# Patient Record
Sex: FEMALE | Race: WHITE | Employment: OTHER | URBAN - NONMETROPOLITAN AREA
[De-identification: names, ages, dates, MRNs, and addresses within clinical notes are randomized per-mention and may not be internally consistent; named-entity substitution may affect disease eponyms.]

---

## 2017-01-10 ENCOUNTER — OFFICE VISIT (OUTPATIENT)
Dept: PRIMARY CARE CLINIC | Age: 72
End: 2017-01-10
Payer: MEDICARE

## 2017-01-10 VITALS
HEIGHT: 64 IN | WEIGHT: 205 LBS | DIASTOLIC BLOOD PRESSURE: 66 MMHG | HEART RATE: 64 BPM | RESPIRATION RATE: 18 BRPM | BODY MASS INDEX: 35 KG/M2 | SYSTOLIC BLOOD PRESSURE: 138 MMHG | TEMPERATURE: 97.5 F | OXYGEN SATURATION: 97 %

## 2017-01-10 DIAGNOSIS — E03.9 HYPOTHYROIDISM, UNSPECIFIED TYPE: ICD-10-CM

## 2017-01-10 DIAGNOSIS — I10 ESSENTIAL HYPERTENSION: Primary | ICD-10-CM

## 2017-01-10 DIAGNOSIS — Z11.59 NEED FOR HEPATITIS C SCREENING TEST: ICD-10-CM

## 2017-01-10 DIAGNOSIS — N18.9 CHRONIC KIDNEY DISEASE, UNSPECIFIED STAGE: ICD-10-CM

## 2017-01-10 PROCEDURE — 99214 OFFICE O/P EST MOD 30 MIN: CPT | Performed by: FAMILY MEDICINE

## 2017-01-10 RX ORDER — METOPROLOL SUCCINATE 50 MG/1
50 TABLET, EXTENDED RELEASE ORAL DAILY
Qty: 90 TABLET | Refills: 3 | Status: SHIPPED | OUTPATIENT
Start: 2017-01-10 | End: 2017-11-29 | Stop reason: SDUPTHER

## 2017-01-10 RX ORDER — HYDROCODONE BITARTRATE AND ACETAMINOPHEN 7.5; 325 MG/1; MG/1
1 TABLET ORAL EVERY 6 HOURS PRN
Qty: 120 TABLET | Refills: 0 | Status: SHIPPED | OUTPATIENT
Start: 2017-01-10 | End: 2017-11-29 | Stop reason: SDUPTHER

## 2017-01-10 RX ORDER — OMEPRAZOLE 40 MG/1
40 CAPSULE, DELAYED RELEASE ORAL DAILY
Qty: 90 CAPSULE | Refills: 3 | Status: SHIPPED | OUTPATIENT
Start: 2017-01-10 | End: 2017-06-19 | Stop reason: SDUPTHER

## 2017-01-10 RX ORDER — LISINOPRIL 10 MG/1
10 TABLET ORAL DAILY
Qty: 90 TABLET | Refills: 3 | Status: SHIPPED | OUTPATIENT
Start: 2017-01-10 | End: 2017-11-29 | Stop reason: SDUPTHER

## 2017-01-10 RX ORDER — LUBIPROSTONE 24 UG/1
24 CAPSULE, GELATIN COATED ORAL 2 TIMES DAILY WITH MEALS
Qty: 60 CAPSULE | Refills: 3 | Status: SHIPPED | OUTPATIENT
Start: 2017-01-10 | End: 2017-10-25 | Stop reason: ALTCHOICE

## 2017-01-10 RX ORDER — OXYBUTYNIN CHLORIDE 5 MG/1
5 TABLET ORAL 2 TIMES DAILY
Qty: 180 TABLET | Refills: 3 | Status: SHIPPED | OUTPATIENT
Start: 2017-01-10 | End: 2017-08-24

## 2017-01-10 RX ORDER — TRAZODONE HYDROCHLORIDE 150 MG/1
150 TABLET ORAL NIGHTLY
Qty: 90 TABLET | Refills: 3 | Status: SHIPPED | OUTPATIENT
Start: 2017-01-10 | End: 2017-11-29 | Stop reason: SDUPTHER

## 2017-01-10 ASSESSMENT — ENCOUNTER SYMPTOMS
BACK PAIN: 0
COUGH: 0
EYE DISCHARGE: 0
ABDOMINAL PAIN: 0
WHEEZING: 0
DIARRHEA: 0
NAUSEA: 0
VOMITING: 0
COLOR CHANGE: 0

## 2017-01-16 ENCOUNTER — TELEPHONE (OUTPATIENT)
Dept: PRIMARY CARE CLINIC | Age: 72
End: 2017-01-16

## 2017-01-20 DIAGNOSIS — Z11.59 NEED FOR HEPATITIS C SCREENING TEST: Primary | ICD-10-CM

## 2017-03-28 RX ORDER — CLOBETASOL PROPIONATE 0.5 MG/G
OINTMENT TOPICAL
Qty: 120 G | Refills: 3 | Status: SHIPPED | OUTPATIENT
Start: 2017-03-28 | End: 2021-04-13

## 2017-07-26 RX ORDER — LEVOTHYROXINE SODIUM 0.12 MG/1
TABLET ORAL
Qty: 90 TABLET | Refills: 0 | Status: SHIPPED | OUTPATIENT
Start: 2017-07-26 | End: 2017-10-26 | Stop reason: SDUPTHER

## 2017-07-26 RX ORDER — METOPROLOL SUCCINATE 50 MG/1
TABLET, EXTENDED RELEASE ORAL
Qty: 90 TABLET | Refills: 0 | Status: SHIPPED | OUTPATIENT
Start: 2017-07-26 | End: 2017-10-25 | Stop reason: SDUPTHER

## 2017-08-01 ENCOUNTER — OFFICE VISIT (OUTPATIENT)
Dept: PRIMARY CARE CLINIC | Age: 72
End: 2017-08-01
Payer: MEDICARE

## 2017-08-01 VITALS
WEIGHT: 199 LBS | HEART RATE: 55 BPM | DIASTOLIC BLOOD PRESSURE: 76 MMHG | OXYGEN SATURATION: 98 % | HEIGHT: 63 IN | RESPIRATION RATE: 18 BRPM | SYSTOLIC BLOOD PRESSURE: 139 MMHG | BODY MASS INDEX: 35.26 KG/M2 | TEMPERATURE: 98 F

## 2017-08-01 DIAGNOSIS — Z23 NEED FOR PNEUMOCOCCAL VACCINATION: ICD-10-CM

## 2017-08-01 DIAGNOSIS — R07.9 CHEST PAIN, UNSPECIFIED TYPE: ICD-10-CM

## 2017-08-01 DIAGNOSIS — K62.89 RECTAL PAIN: Primary | ICD-10-CM

## 2017-08-01 DIAGNOSIS — R01.1 HEART MURMUR: ICD-10-CM

## 2017-08-01 DIAGNOSIS — R42 VERTIGO: ICD-10-CM

## 2017-08-01 DIAGNOSIS — R19.4 CHANGE IN BOWEL HABIT: ICD-10-CM

## 2017-08-01 PROCEDURE — 1123F ACP DISCUSS/DSCN MKR DOCD: CPT | Performed by: FAMILY MEDICINE

## 2017-08-01 PROCEDURE — G8399 PT W/DXA RESULTS DOCUMENT: HCPCS | Performed by: FAMILY MEDICINE

## 2017-08-01 PROCEDURE — 99214 OFFICE O/P EST MOD 30 MIN: CPT | Performed by: FAMILY MEDICINE

## 2017-08-01 PROCEDURE — 1036F TOBACCO NON-USER: CPT | Performed by: FAMILY MEDICINE

## 2017-08-01 PROCEDURE — 4040F PNEUMOC VAC/ADMIN/RCVD: CPT | Performed by: FAMILY MEDICINE

## 2017-08-01 PROCEDURE — G0009 ADMIN PNEUMOCOCCAL VACCINE: HCPCS | Performed by: FAMILY MEDICINE

## 2017-08-01 PROCEDURE — 1090F PRES/ABSN URINE INCON ASSESS: CPT | Performed by: FAMILY MEDICINE

## 2017-08-01 PROCEDURE — G8427 DOCREV CUR MEDS BY ELIG CLIN: HCPCS | Performed by: FAMILY MEDICINE

## 2017-08-01 PROCEDURE — 3014F SCREEN MAMMO DOC REV: CPT | Performed by: FAMILY MEDICINE

## 2017-08-01 PROCEDURE — 90732 PPSV23 VACC 2 YRS+ SUBQ/IM: CPT | Performed by: FAMILY MEDICINE

## 2017-08-01 PROCEDURE — 3017F COLORECTAL CA SCREEN DOC REV: CPT | Performed by: FAMILY MEDICINE

## 2017-08-01 PROCEDURE — G8417 CALC BMI ABV UP PARAM F/U: HCPCS | Performed by: FAMILY MEDICINE

## 2017-08-01 RX ORDER — MECLIZINE HYDROCHLORIDE 25 MG/1
25 TABLET ORAL 3 TIMES DAILY PRN
COMMUNITY
End: 2019-03-20

## 2017-08-11 ASSESSMENT — ENCOUNTER SYMPTOMS
BACK PAIN: 0
EYE DISCHARGE: 0
BLOOD IN STOOL: 1
COUGH: 0
SHORTNESS OF BREATH: 1
DIARRHEA: 0
COLOR CHANGE: 0
CHEST TIGHTNESS: 1
NAUSEA: 0
RECTAL PAIN: 1
ABDOMINAL PAIN: 1
CONSTIPATION: 1
VOMITING: 0
WHEEZING: 0

## 2017-08-22 RX ORDER — TIZANIDINE 4 MG/1
TABLET ORAL
Qty: 270 TABLET | Refills: 0 | Status: SHIPPED | OUTPATIENT
Start: 2017-08-22 | End: 2018-01-03 | Stop reason: SDUPTHER

## 2017-08-24 RX ORDER — OXYBUTYNIN CHLORIDE 5 MG/1
TABLET ORAL
Qty: 180 TABLET | Refills: 0 | Status: SHIPPED | OUTPATIENT
Start: 2017-08-24 | End: 2017-11-27 | Stop reason: SDUPTHER

## 2017-10-03 RX ORDER — ALLOPURINOL 100 MG/1
100 TABLET ORAL DAILY
Qty: 30 TABLET | Refills: 3 | Status: SHIPPED | OUTPATIENT
Start: 2017-10-03 | End: 2017-11-29 | Stop reason: SDUPTHER

## 2017-10-24 ENCOUNTER — TELEPHONE (OUTPATIENT)
Dept: CARDIOLOGY | Age: 72
End: 2017-10-24

## 2017-10-25 ENCOUNTER — OFFICE VISIT (OUTPATIENT)
Dept: CARDIOLOGY | Age: 72
End: 2017-10-25
Payer: MEDICARE

## 2017-10-25 VITALS
HEIGHT: 63 IN | HEART RATE: 68 BPM | SYSTOLIC BLOOD PRESSURE: 136 MMHG | DIASTOLIC BLOOD PRESSURE: 74 MMHG | WEIGHT: 194 LBS | BODY MASS INDEX: 34.38 KG/M2

## 2017-10-25 DIAGNOSIS — I10 ESSENTIAL HYPERTENSION: Primary | ICD-10-CM

## 2017-10-25 PROCEDURE — 1123F ACP DISCUSS/DSCN MKR DOCD: CPT | Performed by: INTERNAL MEDICINE

## 2017-10-25 PROCEDURE — 93000 ELECTROCARDIOGRAM COMPLETE: CPT | Performed by: INTERNAL MEDICINE

## 2017-10-25 PROCEDURE — G8427 DOCREV CUR MEDS BY ELIG CLIN: HCPCS | Performed by: INTERNAL MEDICINE

## 2017-10-25 PROCEDURE — 1036F TOBACCO NON-USER: CPT | Performed by: INTERNAL MEDICINE

## 2017-10-25 PROCEDURE — 4040F PNEUMOC VAC/ADMIN/RCVD: CPT | Performed by: INTERNAL MEDICINE

## 2017-10-25 PROCEDURE — G8399 PT W/DXA RESULTS DOCUMENT: HCPCS | Performed by: INTERNAL MEDICINE

## 2017-10-25 PROCEDURE — 3017F COLORECTAL CA SCREEN DOC REV: CPT | Performed by: INTERNAL MEDICINE

## 2017-10-25 PROCEDURE — 99204 OFFICE O/P NEW MOD 45 MIN: CPT | Performed by: INTERNAL MEDICINE

## 2017-10-25 PROCEDURE — 3014F SCREEN MAMMO DOC REV: CPT | Performed by: INTERNAL MEDICINE

## 2017-10-25 PROCEDURE — G8417 CALC BMI ABV UP PARAM F/U: HCPCS | Performed by: INTERNAL MEDICINE

## 2017-10-25 PROCEDURE — G8484 FLU IMMUNIZE NO ADMIN: HCPCS | Performed by: INTERNAL MEDICINE

## 2017-10-25 PROCEDURE — 1090F PRES/ABSN URINE INCON ASSESS: CPT | Performed by: INTERNAL MEDICINE

## 2017-10-25 RX ORDER — FLUTICASONE PROPIONATE 50 MCG
1 SPRAY, SUSPENSION (ML) NASAL DAILY
COMMUNITY
End: 2019-03-20

## 2017-10-25 NOTE — PROGRESS NOTES
Dear Dr. Elizabeth Ramirez MD,    Thank you for allowing me to participate in the care of Ms. Cheko Sanchez. She presents today at the [address] [place] [address] in the [facility] with her daughter, and . As you know, Ms. Elsa Beverly is a 81 y.o. female with history of hypertension, hearing loss, vertigo who presents with the chief complaint of chest pain. The patient tells me that for the past 5 months she has had 5 episodes of chest pressure has been mid substernal and did not radiate. She would have a dull ache in her left shoulder blade afterwards. This was sometimes be associated with breathlessness and would last no more than 5 minutes. This would come on always at rest.  He would not get worse with walking. Only time and make it better. She was recently diagnosed with Almodovar's esophagus and complains of food getting stuck in her esophagus. She is not found to have a stricture. She had a Jodean Mower in July that was negative and a transthoracic echo which showed mild mitral regurgitation, mild tricuspid regurgitation, and a normal LV systolic function. She otherwise denies SOA, BARBOSA, PND, orthopnea, syncope or near syncope. She has no other complaints. She does not have a family history of heart disease, she has not smoker, she does not have diabetes or hyperlipidemia.     ROS    Past Medical History:   Diagnosis Date    Abdominal pain, unspecified site     Antral gastritis     Anxiety and depression     Arthritis     Back pain     Chest pressure 05/2017    Decrease in appetite     Ear infection     GERD (gastroesophageal reflux disease)     Heart murmur     History of colon polyps     Mastodynia     rt axilla into breast    Neck pain     Otalgia, unspecified     Rheumatoid arthritis(714.0)     Sigmoid diverticulosis     Unspecified otitis media     Weight loss     30 lbs in 4-5 months       Past Surgical History:   Procedure Laterality Date    tablet, Rfl: 0    omeprazole (PRILOSEC) 40 MG delayed release capsule, TAKE 1 CAPSULE BY MOUTH DAILY, Disp: 90 capsule, Rfl: 3    clobetasol (TEMOVATE) 0.05 % ointment, Apply topically 2 times daily. , Disp: 120 g, Rfl: 3    traZODone (DESYREL) 150 MG tablet, Take 1 tablet by mouth nightly, Disp: 90 tablet, Rfl: 3    lisinopril (PRINIVIL;ZESTRIL) 10 MG tablet, Take 1 tablet by mouth daily, Disp: 90 tablet, Rfl: 3    metoprolol succinate (TOPROL XL) 50 MG extended release tablet, Take 1 tablet by mouth daily, Disp: 90 tablet, Rfl: 3    HYDROcodone-acetaminophen (NORCO) 7.5-325 MG per tablet, Take 1 tablet by mouth every 6 hours as needed for Pain ., Disp: 120 tablet, Rfl: 0    colchicine (COLCRYS) 0.6 MG tablet, 1.2 mg po and then 0.6 mg po 1 hour later (Patient taking differently: Take 0.6 mg by mouth as needed 1.2 mg po and then 0.6 mg po 1 hour later), Disp: 3 tablet, Rfl: 3    Omega-3 Fatty Acids (FISH OIL BURP-LESS PO), Take by mouth, Disp: , Rfl:     vitamin D (ERGOCALCIFEROL) 30810 UNITS CAPS capsule, Take 50,000 Units by mouth once a week , Disp: , Rfl: 3    TRAVATAN Z 0.004 % SOLN ophthalmic solution, Place 1 drop into both eyes nightly , Disp: , Rfl:     lidocaine (LIDODERM) 5 %, Place 1 patch onto the skin daily 12 hours on, 12 hours off., Disp: 90 patch, Rfl: 3    acetaminophen (TYLENOL) 325 MG tablet, Take 650 mg by mouth every 6 hours as needed for Pain., Disp: , Rfl:     calcium carbonate 600 MG TABS tablet, Take 1 tablet by mouth daily as needed. , Disp: , Rfl:     PE:  Vitals:    10/25/17 1418   BP: 136/74   Pulse: 68       Estimated body mass index is 34.37 kg/m² as calculated from the following:    Height as of this encounter: 5' 3\" (1.6 m). Weight as of this encounter: 194 lb (88 kg).     General - No acute distress  Eyes - PERRL, anicteric sclerae; no lid-lag  ENMT - Atraumatic; Mucous membranes moist, oropharynx clear  Neck - trachea midline, thyroid non-tender  Cardio - No jugular venous distension                Clear s1 s2, no gallop, rub, murmur                 No edema, normal pulses  Resp - Normal effort, Clear to auscultation bilaterally  GI - abdomen soft, non-tender, no hepatosplenomegaly  Skin - warm and dry; no rashes  Psych - A+O x 3, normal affect    Lab Results   Component Value Date    CREATININE 1.1 12/23/2013    CREATININE 1.5 10/04/2013    HGB 12.8 12/23/2013       ECG 10/25/17  Sinus rhythm, poor R-wave progression     Assessment, Recommendations, & Plan:  81 y.o. female with atypical chest pain, hypertension, vertigo    Chest pain - I have a greater sense of confidence that this is a noncardiac chest pain with her negative stress test.  She also has active GI symptoms and this could be very well esophageal spasm. At this time, I think monitoring her for symptoms as best.  She is normally active and doesn't have problems with chest pain though she is slowed by her orthopedic issues. She is going to monitor her activity especially while at [facility] in follow-up with me. We may consider a different modality for stressing first before they go to cath. We'll consideration would be changing her PPI to something else. Hypertension - controlled, no changes    Vertigo - I think this has something to do with her right ear hearing loss; she says that when the room spins and she gets dizzy she always falls to the left. Continue meclizine    Disposition - RTC in 3 months or sooner if needed    Thank you very much for allowing me to participate in this patient's care. Please do not hesitate to contact me for any questions or concerns. Sincerely yours,    Davey Arellano MD, MSc  Structural Heart Disease Interventions  [facility]

## 2017-10-26 RX ORDER — LEVOTHYROXINE SODIUM 0.12 MG/1
TABLET ORAL
Qty: 90 TABLET | Refills: 5 | Status: SHIPPED | OUTPATIENT
Start: 2017-10-26 | End: 2018-11-12 | Stop reason: SDUPTHER

## 2017-11-27 RX ORDER — OXYBUTYNIN CHLORIDE 5 MG/1
5 TABLET ORAL 2 TIMES DAILY
Qty: 180 TABLET | Refills: 3 | Status: SHIPPED | OUTPATIENT
Start: 2017-11-27 | End: 2019-07-15

## 2017-11-29 ENCOUNTER — OFFICE VISIT (OUTPATIENT)
Dept: PRIMARY CARE CLINIC | Age: 72
End: 2017-11-29
Payer: MEDICARE

## 2017-11-29 VITALS
HEART RATE: 63 BPM | DIASTOLIC BLOOD PRESSURE: 66 MMHG | WEIGHT: 201 LBS | SYSTOLIC BLOOD PRESSURE: 138 MMHG | HEIGHT: 64 IN | RESPIRATION RATE: 20 BRPM | OXYGEN SATURATION: 98 % | TEMPERATURE: 97.3 F | BODY MASS INDEX: 34.31 KG/M2

## 2017-11-29 DIAGNOSIS — E03.9 ACQUIRED HYPOTHYROIDISM: ICD-10-CM

## 2017-11-29 DIAGNOSIS — Z00.00 ROUTINE GENERAL MEDICAL EXAMINATION AT A HEALTH CARE FACILITY: ICD-10-CM

## 2017-11-29 DIAGNOSIS — Z12.31 SCREENING MAMMOGRAM, ENCOUNTER FOR: Primary | ICD-10-CM

## 2017-11-29 PROCEDURE — G0438 PPPS, INITIAL VISIT: HCPCS | Performed by: FAMILY MEDICINE

## 2017-11-29 RX ORDER — OMEPRAZOLE 40 MG/1
40 CAPSULE, DELAYED RELEASE ORAL DAILY
Qty: 90 CAPSULE | Refills: 3 | Status: SHIPPED | OUTPATIENT
Start: 2017-11-29 | End: 2019-01-03 | Stop reason: SDUPTHER

## 2017-11-29 RX ORDER — TRAZODONE HYDROCHLORIDE 150 MG/1
150 TABLET ORAL NIGHTLY
Qty: 90 TABLET | Refills: 3 | Status: SHIPPED | OUTPATIENT
Start: 2017-11-29 | End: 2019-03-20 | Stop reason: DRUGHIGH

## 2017-11-29 RX ORDER — LISINOPRIL 10 MG/1
10 TABLET ORAL DAILY
Qty: 90 TABLET | Refills: 3 | Status: SHIPPED | OUTPATIENT
Start: 2017-11-29 | End: 2018-02-06 | Stop reason: SDUPTHER

## 2017-11-29 RX ORDER — HYDROCODONE BITARTRATE AND ACETAMINOPHEN 7.5; 325 MG/1; MG/1
1 TABLET ORAL EVERY 6 HOURS PRN
Qty: 120 TABLET | Refills: 0 | Status: SHIPPED | OUTPATIENT
Start: 2017-11-29 | End: 2018-11-13 | Stop reason: SDUPTHER

## 2017-11-29 RX ORDER — METOPROLOL SUCCINATE 50 MG/1
50 TABLET, EXTENDED RELEASE ORAL DAILY
Qty: 90 TABLET | Refills: 3 | Status: SHIPPED | OUTPATIENT
Start: 2017-11-29 | End: 2018-01-22 | Stop reason: SDUPTHER

## 2017-11-29 RX ORDER — ALLOPURINOL 100 MG/1
100 TABLET ORAL DAILY
Qty: 30 TABLET | Refills: 3 | Status: SHIPPED | OUTPATIENT
Start: 2017-11-29 | End: 2018-06-07 | Stop reason: SDUPTHER

## 2017-11-29 ASSESSMENT — LIFESTYLE VARIABLES: HOW OFTEN DO YOU HAVE A DRINK CONTAINING ALCOHOL: 0

## 2017-11-29 ASSESSMENT — PATIENT HEALTH QUESTIONNAIRE - PHQ9
SUM OF ALL RESPONSES TO PHQ QUESTIONS 1-9: 0
2. FEELING DOWN, DEPRESSED OR HOPELESS: 0
SUM OF ALL RESPONSES TO PHQ9 QUESTIONS 1 & 2: 0
SUM OF ALL RESPONSES TO PHQ QUESTIONS 1-9: 0
1. LITTLE INTEREST OR PLEASURE IN DOING THINGS: 0

## 2017-11-29 ASSESSMENT — ANXIETY QUESTIONNAIRES: GAD7 TOTAL SCORE: 0

## 2017-11-30 NOTE — PROGRESS NOTES
Medicare Annual Wellness Visit  Name: Chula Brar Date: 2017   MRN: [id number] Sex: Female   Age: 81 y.o. Ethnicity: Non-/Non    : 1945 Race: Alex Talbert is here for Medicare AWV    Screenings for behavioral, psychosocial and functional/safety risks, and cognitive dysfunction are all negative except as indicated below. These results, as well as other patient data from the [address] [facility] [address] form, are documented in Flowsheets linked to this Encounter. Allergies   Allergen Reactions    Fish-Derived Products Swelling     Smell of cooking fish causes hives and throat closing off    Pcn [Penicillins] Hives     Prior to Visit Medications    Medication Sig Taking? Authorizing Provider   allopurinol (ZYLOPRIM) 100 MG tablet Take 1 tablet by mouth daily Yes Emil Duncan MD   omeprazole (PRILOSEC) 40 MG delayed release capsule Take 1 capsule by mouth daily Yes Emil Duncan MD   traZODone (DESYREL) 150 MG tablet Take 1 tablet by mouth nightly Yes Emil Duncan MD   lisinopril (PRINIVIL;ZESTRIL) 10 MG tablet Take 1 tablet by mouth daily Yes Emil Duncan MD   metoprolol succinate (TOPROL XL) 50 MG extended release tablet Take 1 tablet by mouth daily Yes Emil Duncan MD   HYDROcodone-acetaminophen (NORCO) 7.5-325 MG per tablet Take 1 tablet by mouth every 6 hours as needed for Pain . Yes Emil Duncan MD   oxybutynin (DITROPAN) 5 MG tablet Take 1 tablet by mouth 2 times daily Yes Naty Forman MD   levothyroxine (SYNTHROID) 125 MCG tablet TAKE 1 TABLET BY MOUTH EVERY DAY Yes Naty Forman MD   tiZANidine (ZANAFLEX) 4 MG tablet TAKE 1 TABLET BY MOUTH THREE TIMES DAILY Yes Naty Forman MD   meclizine (ANTIVERT) 25 MG tablet Take 25 mg by mouth 3 times daily as needed Yes Historical Provider, MD   clobetasol (TEMOVATE) 0.05 % ointment Apply topically 2 times daily.  Yes Ronda Whitman MD   colchicine (COLCRYS) 0.6 MG tablet 1.2 mg po and then 0.6 mg po 1 hour later  Patient taking differently: Take 0.6 mg by mouth as needed 1.2 mg po and then 0.6 mg po 1 hour later Yes Naty Forman MD   Omega-3 Fatty Acids (FISH OIL BURP-LESS PO) Take by mouth Yes Historical Provider, MD   TRAVATAN Z 0.004 % SOLN ophthalmic solution Place 1 drop into both eyes nightly  Yes Historical Provider, MD   lidocaine (LIDODERM) 5 % Place 1 patch onto the skin daily 12 hours on, 12 hours off. Yes MILO Steve   acetaminophen (TYLENOL) 325 MG tablet Take 650 mg by mouth every 6 hours as needed for Pain. Yes Historical Provider, MD   calcium carbonate 600 MG TABS tablet Take 1 tablet by mouth daily as needed.  Yes Historical Provider, MD   fluticasone (FLONASE) 50 MCG/ACT nasal spray 1 spray by Nasal route daily  Historical Provider, MD   vitamin D (ERGOCALCIFEROL) 78445 UNITS CAPS capsule Take 50,000 Units by mouth once a week   Historical Provider, MD     Past Medical History:   Diagnosis Date    Abdominal pain, unspecified site     Antral gastritis     Anxiety and depression     Arthritis     Back pain     Chest pressure 05/2017    Decrease in appetite     Ear infection     GERD (gastroesophageal reflux disease)     Heart murmur     History of colon polyps     Mastodynia     rt axilla into breast    Neck pain     Otalgia, unspecified     Rheumatoid arthritis(714.0)     Sigmoid diverticulosis     Unspecified otitis media     Weight loss     30 lbs in 4-5 months     Past Surgical History:   Procedure Laterality Date    CHOLECYSTECTOMY  9-8-11    Dr Anel Rincon    COLONOSCOPY  9-12-12    Woody Thibodeaux    INTRACAPSULAR CATARACT EXTRACTION Left 12/28/2015    PHACO CAT EXT W/ IOL LT EYE performed by Symone Paul MD at [facility] Right 2/15/2016    PHACO CAT EXT W/ IOL RT EYE performed by Symone Paul MD at [address] [place] [address] OR    TUBAL LIGATION      UPPER GASTROINTESTINAL ENDOSCOPY  9-12-12    Woody Hu     Family History   Problem Relation Age of Onset    Stroke Father     Cancer Brother      throat   Tiana Gonzales Other Mother      had colectomy and surgeries due to adhesions    Colon Cancer Paternal Uncle     Colon Polyps Paternal Uncle     Diabetes Sister        CareTeam (Including outside providers/suppliers regularly involved in providing care):   Patient Care Team:  George Rae MD as PCP - General (Family Medicine)  George Rae MD as PCP - S Attributed Provider  Davey Back MD as Consulting Physician (Interventional Cardiology)    Wt Readings from Last 3 Encounters:   11/29/17 201 lb (91.2 kg)   10/25/17 194 lb (88 kg)   08/01/17 199 lb (90.3 kg)     Vitals:    11/29/17 1122   BP: 138/66   Site: Right Arm   Position: Sitting   Cuff Size: Medium Adult   Pulse: 63   Resp: 20   Temp: 97.3 °F (36.3 °C)   TempSrc: Temporal   SpO2: 98%   Weight: 201 lb (91.2 kg)   Height: 5' 3.5\" (1.613 m)       General Appearance: alert and oriented to person, place and time, well-developed and well-nourished, in no acute distress  Neck: neck supple and non tender without mass, no thyromegaly or thyroid nodules, no cervical lymphadenopathy   Pulmonary/Chest: clear to auscultation bilaterally- no wheezes, rales or rhonchi, normal air movement, no respiratory distress  Cardiovascular: normal rate, normal S1 and S2, no gallops, intact distal pulses and no carotid bruits  Neurologic: gait and coordination normal and speech normal    Patient's complete Health Risk Assessment and screening values have been reviewed and are found in Flowsheets. The following problems were reviewed today and where indicated follow up appointments were made and/or referrals ordered.     Positive Risk Factor Screenings with Interventions:     Depression:  PHQ-2 Score: 0  Depression Screening Interpretation: (!) PHQ-9 Score 5-9: Mild Addressed    DEXA (modify frequency per FRAX score)  Completed    Pneumococcal low/med risk  Completed     Recommendations for Preventive Services Due: see orders.   Recommended screening schedule for the next 5-10 years is provided to the patient in written form: see Patient Instructions/AVS.

## 2018-01-04 RX ORDER — TRAZODONE HYDROCHLORIDE 150 MG/1
TABLET ORAL
Qty: 90 TABLET | Refills: 0 | Status: SHIPPED | OUTPATIENT
Start: 2018-01-04 | End: 2018-04-09 | Stop reason: SDUPTHER

## 2018-01-22 RX ORDER — METOPROLOL SUCCINATE 50 MG/1
50 TABLET, EXTENDED RELEASE ORAL DAILY
Qty: 90 TABLET | Refills: 3 | Status: SHIPPED | OUTPATIENT
Start: 2018-01-22 | End: 2019-04-14 | Stop reason: SDUPTHER

## 2018-02-22 RX ORDER — OXYBUTYNIN CHLORIDE 5 MG/1
TABLET ORAL
Qty: 180 TABLET | Refills: 0 | Status: SHIPPED | OUTPATIENT
Start: 2018-02-22 | End: 2018-06-28 | Stop reason: SDUPTHER

## 2018-04-03 ENCOUNTER — PATIENT MESSAGE (OUTPATIENT)
Dept: PRIMARY CARE CLINIC | Age: 73
End: 2018-04-03

## 2018-04-03 DIAGNOSIS — E03.9 ACQUIRED HYPOTHYROIDISM: Primary | ICD-10-CM

## 2018-04-09 RX ORDER — TRAZODONE HYDROCHLORIDE 150 MG/1
TABLET ORAL
Qty: 90 TABLET | Refills: 0 | Status: SHIPPED | OUTPATIENT
Start: 2018-04-09 | End: 2018-11-13 | Stop reason: CLARIF

## 2018-06-07 RX ORDER — ALLOPURINOL 100 MG/1
100 TABLET ORAL DAILY
Qty: 30 TABLET | Refills: 3 | Status: SHIPPED | OUTPATIENT
Start: 2018-06-07 | End: 2018-10-04 | Stop reason: SDUPTHER

## 2018-06-28 RX ORDER — OXYBUTYNIN CHLORIDE 5 MG/1
TABLET ORAL
Qty: 180 TABLET | Refills: 3 | Status: SHIPPED | OUTPATIENT
Start: 2018-06-28 | End: 2018-11-13 | Stop reason: CLARIF

## 2018-11-12 RX ORDER — LEVOTHYROXINE SODIUM 0.12 MG/1
TABLET ORAL
Qty: 90 TABLET | Refills: 0 | Status: SHIPPED | OUTPATIENT
Start: 2018-11-12 | End: 2019-02-13 | Stop reason: SDUPTHER

## 2018-11-13 ENCOUNTER — OFFICE VISIT (OUTPATIENT)
Dept: PRIMARY CARE CLINIC | Age: 73
End: 2018-11-13
Payer: MEDICARE

## 2018-11-13 VITALS
DIASTOLIC BLOOD PRESSURE: 74 MMHG | BODY MASS INDEX: 33.13 KG/M2 | SYSTOLIC BLOOD PRESSURE: 120 MMHG | WEIGHT: 187 LBS | HEART RATE: 78 BPM | OXYGEN SATURATION: 98 % | TEMPERATURE: 97.3 F | HEIGHT: 63 IN

## 2018-11-13 DIAGNOSIS — M06.9 RHEUMATOID ARTHRITIS INVOLVING MULTIPLE SITES, UNSPECIFIED RHEUMATOID FACTOR PRESENCE: Primary | ICD-10-CM

## 2018-11-13 DIAGNOSIS — Z79.899 POLYPHARMACY: ICD-10-CM

## 2018-11-13 DIAGNOSIS — I10 ESSENTIAL HYPERTENSION: ICD-10-CM

## 2018-11-13 PROCEDURE — G8427 DOCREV CUR MEDS BY ELIG CLIN: HCPCS | Performed by: FAMILY MEDICINE

## 2018-11-13 PROCEDURE — 3017F COLORECTAL CA SCREEN DOC REV: CPT | Performed by: FAMILY MEDICINE

## 2018-11-13 PROCEDURE — G8417 CALC BMI ABV UP PARAM F/U: HCPCS | Performed by: FAMILY MEDICINE

## 2018-11-13 PROCEDURE — 1123F ACP DISCUSS/DSCN MKR DOCD: CPT | Performed by: FAMILY MEDICINE

## 2018-11-13 PROCEDURE — 1101F PT FALLS ASSESS-DOCD LE1/YR: CPT | Performed by: FAMILY MEDICINE

## 2018-11-13 PROCEDURE — 99214 OFFICE O/P EST MOD 30 MIN: CPT | Performed by: FAMILY MEDICINE

## 2018-11-13 PROCEDURE — G8484 FLU IMMUNIZE NO ADMIN: HCPCS | Performed by: FAMILY MEDICINE

## 2018-11-13 PROCEDURE — G8399 PT W/DXA RESULTS DOCUMENT: HCPCS | Performed by: FAMILY MEDICINE

## 2018-11-13 PROCEDURE — 4040F PNEUMOC VAC/ADMIN/RCVD: CPT | Performed by: FAMILY MEDICINE

## 2018-11-13 PROCEDURE — 1090F PRES/ABSN URINE INCON ASSESS: CPT | Performed by: FAMILY MEDICINE

## 2018-11-13 PROCEDURE — 1036F TOBACCO NON-USER: CPT | Performed by: FAMILY MEDICINE

## 2018-11-13 RX ORDER — TRAZODONE HYDROCHLORIDE 50 MG/1
50 TABLET ORAL NIGHTLY PRN
Qty: 90 TABLET | Refills: 3 | Status: SHIPPED | OUTPATIENT
Start: 2018-11-13 | End: 2020-06-18

## 2018-11-13 RX ORDER — HYDROCODONE BITARTRATE AND ACETAMINOPHEN 7.5; 325 MG/1; MG/1
1 TABLET ORAL EVERY 6 HOURS PRN
Qty: 120 TABLET | Refills: 0 | Status: SHIPPED | OUTPATIENT
Start: 2018-11-13 | End: 2019-03-20 | Stop reason: SDUPTHER

## 2018-11-15 ASSESSMENT — ENCOUNTER SYMPTOMS
COLOR CHANGE: 0
NAUSEA: 0
ABDOMINAL PAIN: 0
EYE DISCHARGE: 0
VOMITING: 0
DIARRHEA: 0
BACK PAIN: 0
COUGH: 0
WHEEZING: 0

## 2018-11-15 NOTE — PROGRESS NOTES
SUBJECTIVE:    Patient ID: Alfa Torres is a 72 y.o. female. HPI:   Patient presents today for a follow-up. She states that overall she is doing fairly well. She states that she went to the hospital because she had a lot of dizziness, lightheadedness, and fatigue. She was taking her Zanaflex 3 times a day. She states that she was also taking trazodone 150 mg nightly every night. She states that they told her that these could be the culprit for her feeling bad. She states that she has stopped taking the Zanaflex altogether. She states that she is also been breaking her trazodone in half and feels much better. She states that she is still sleeping well at night. She states that she is staying much less fatigued during the day and is much less lightheaded. She states that her dizziness is almost resolved. She does have a history of rheumatoid arthritis and occasionally takes hydrocodone. She only takes this when she needs it and uses it very sparingly. She states that she is needing a refill because she has run out of her medication. She states that she has not filled it since last year. Typically 1 prescription will last her for the full year. She is monitoring her blood pressures and they're running well controlled. She denies any side effects with her medications and is tolerating them well. She denies any chest pain or palpitations.   Past Medical History:   Diagnosis Date    Abdominal pain, unspecified site     Antral gastritis     Anxiety and depression     Arthritis     Back pain     Chest pressure 05/2017    Decrease in appetite     Ear infection     GERD (gastroesophageal reflux disease)     Heart murmur     History of colon polyps     Mastodynia     rt axilla into breast    Neck pain     Otalgia, unspecified     Rheumatoid arthritis(714.0)     Sigmoid diverticulosis     Unspecified otitis media     Weight loss     30 lbs in 4-5 months      Current Outpatient medications for this visit. Allergies   Allergen Reactions    Fish-Derived Products Swelling     Smell of cooking fish causes hives and throat closing off    Pcn [Penicillins] Hives       Review of Systems   Constitutional: Negative for activity change, appetite change and fever. HENT: Negative for congestion and nosebleeds. Eyes: Negative for discharge. Respiratory: Negative for cough and wheezing. Cardiovascular: Negative for chest pain and leg swelling. Gastrointestinal: Negative for abdominal pain, diarrhea, nausea and vomiting. Genitourinary: Negative for difficulty urinating, frequency and urgency. Musculoskeletal: Negative for back pain and gait problem. Skin: Negative for color change and rash. Neurological: Negative for dizziness, light-headedness and headaches. Hematological: Does not bruise/bleed easily. Psychiatric/Behavioral: Negative for sleep disturbance and suicidal ideas. OBJECTIVE:    Physical Exam   Constitutional: She is oriented to person, place, and time. She appears well-developed. No distress. HENT:   Head: Normocephalic and atraumatic. Neck: Normal range of motion. Neck supple. Cardiovascular: Normal rate, regular rhythm and normal heart sounds. No murmur heard. Pulmonary/Chest: Effort normal and breath sounds normal. No respiratory distress. Neurological: She is alert and oriented to person, place, and time. Skin: Skin is warm and dry. She is not diaphoretic. Psychiatric: She has a normal mood and affect. Her behavior is normal. Judgment and thought content normal.      /74   Pulse 78   Temp 97.3 °F (36.3 °C) (Temporal)   Ht 5' 3\" (1.6 m)   Wt 187 lb (84.8 kg)   SpO2 98%   BMI 33.13 kg/m²      ASSESSMENT:    Maddy Kerr was seen today for medication refill and discuss medications.     Diagnoses and all orders for this visit:    Rheumatoid arthritis involving multiple sites, unspecified rheumatoid factor presence ([facility] [address]  - HYDROcodone-acetaminophen (NORCO) 7.5-325 MG per tablet; Take 1 tablet by mouth every 6 hours as needed for Pain for up to 30 days. .    Essential hypertension    Polypharmacy    Other orders  -     traZODone (DESYREL) 50 MG tablet; Take 1 tablet by mouth nightly as needed for Sleep        PLAN:    Continue current medications. We are going to decrease trazodone anymore since she is sleeping well and try doing the 50 mg.  I've refilled her [place] today. Anmol requested. Follow-up with us in 6 months for checkup unless needed sooner. EMR Dragon/transcription disclaimer:  Much of this encounter note is electronic transcription/translation of spoken language toprinted texts. The electronic translation of spoken language may be erroneous, or at times, nonsensical words or phrases may be inadvertently transcribed.   Although I have reviewed the note for such errors, some may stillexist.

## 2018-12-17 RX ORDER — ALLOPURINOL 100 MG/1
100 TABLET ORAL DAILY
Qty: 30 TABLET | Refills: 2 | Status: SHIPPED | OUTPATIENT
Start: 2018-12-17 | End: 2019-03-19 | Stop reason: SDUPTHER

## 2019-01-03 RX ORDER — OMEPRAZOLE 40 MG/1
40 CAPSULE, DELAYED RELEASE ORAL DAILY
Qty: 90 CAPSULE | Refills: 0 | Status: SHIPPED | OUTPATIENT
Start: 2019-01-03 | End: 2019-04-13 | Stop reason: SDUPTHER

## 2019-02-13 RX ORDER — LEVOTHYROXINE SODIUM 0.12 MG/1
TABLET ORAL
Qty: 90 TABLET | Refills: 0 | Status: SHIPPED | OUTPATIENT
Start: 2019-02-13 | End: 2019-05-20 | Stop reason: SDUPTHER

## 2019-03-04 RX ORDER — COLCHICINE 0.6 MG/1
0.6 TABLET ORAL DAILY
Qty: 30 TABLET | Refills: 1 | Status: SHIPPED | OUTPATIENT
Start: 2019-03-04 | End: 2019-03-20 | Stop reason: SDUPTHER

## 2019-03-19 RX ORDER — ALLOPURINOL 100 MG/1
100 TABLET ORAL DAILY
Qty: 30 TABLET | Refills: 3 | Status: SHIPPED | OUTPATIENT
Start: 2019-03-19 | End: 2019-07-26 | Stop reason: SDUPTHER

## 2019-03-20 ENCOUNTER — OFFICE VISIT (OUTPATIENT)
Dept: PRIMARY CARE CLINIC | Age: 74
End: 2019-03-20
Payer: MEDICARE

## 2019-03-20 VITALS
BODY MASS INDEX: 32.25 KG/M2 | OXYGEN SATURATION: 98 % | WEIGHT: 182 LBS | DIASTOLIC BLOOD PRESSURE: 72 MMHG | RESPIRATION RATE: 22 BRPM | SYSTOLIC BLOOD PRESSURE: 114 MMHG | HEIGHT: 63 IN | HEART RATE: 67 BPM | TEMPERATURE: 97.6 F

## 2019-03-20 DIAGNOSIS — N18.30 STAGE 3 CHRONIC KIDNEY DISEASE (HCC): ICD-10-CM

## 2019-03-20 DIAGNOSIS — M06.9 RHEUMATOID ARTHRITIS INVOLVING MULTIPLE SITES, UNSPECIFIED RHEUMATOID FACTOR PRESENCE: ICD-10-CM

## 2019-03-20 DIAGNOSIS — G89.29 CHRONIC PAIN OF LEFT KNEE: Primary | ICD-10-CM

## 2019-03-20 DIAGNOSIS — M54.9 BACK PAIN WITH RADIATION: ICD-10-CM

## 2019-03-20 DIAGNOSIS — M25.562 CHRONIC PAIN OF LEFT KNEE: Primary | ICD-10-CM

## 2019-03-20 PROCEDURE — G8399 PT W/DXA RESULTS DOCUMENT: HCPCS | Performed by: FAMILY MEDICINE

## 2019-03-20 PROCEDURE — G8427 DOCREV CUR MEDS BY ELIG CLIN: HCPCS | Performed by: FAMILY MEDICINE

## 2019-03-20 PROCEDURE — 1036F TOBACCO NON-USER: CPT | Performed by: FAMILY MEDICINE

## 2019-03-20 PROCEDURE — 1101F PT FALLS ASSESS-DOCD LE1/YR: CPT | Performed by: FAMILY MEDICINE

## 2019-03-20 PROCEDURE — 1123F ACP DISCUSS/DSCN MKR DOCD: CPT | Performed by: FAMILY MEDICINE

## 2019-03-20 PROCEDURE — G8417 CALC BMI ABV UP PARAM F/U: HCPCS | Performed by: FAMILY MEDICINE

## 2019-03-20 PROCEDURE — 4040F PNEUMOC VAC/ADMIN/RCVD: CPT | Performed by: FAMILY MEDICINE

## 2019-03-20 PROCEDURE — G8482 FLU IMMUNIZE ORDER/ADMIN: HCPCS | Performed by: FAMILY MEDICINE

## 2019-03-20 PROCEDURE — 99214 OFFICE O/P EST MOD 30 MIN: CPT | Performed by: FAMILY MEDICINE

## 2019-03-20 PROCEDURE — 3017F COLORECTAL CA SCREEN DOC REV: CPT | Performed by: FAMILY MEDICINE

## 2019-03-20 PROCEDURE — 1090F PRES/ABSN URINE INCON ASSESS: CPT | Performed by: FAMILY MEDICINE

## 2019-03-20 RX ORDER — COLCHICINE 0.6 MG/1
0.6 TABLET ORAL DAILY
Qty: 3 TABLET | Refills: 2 | Status: SHIPPED | OUTPATIENT
Start: 2019-03-20 | End: 2020-05-08

## 2019-03-20 RX ORDER — LIDOCAINE 50 MG/G
1 PATCH TOPICAL DAILY
Qty: 90 PATCH | Refills: 3 | Status: SHIPPED | OUTPATIENT
Start: 2019-03-20 | End: 2020-06-18 | Stop reason: SDUPTHER

## 2019-03-20 RX ORDER — HYDROCODONE BITARTRATE AND ACETAMINOPHEN 7.5; 325 MG/1; MG/1
1 TABLET ORAL EVERY 6 HOURS PRN
Qty: 120 TABLET | Refills: 0 | Status: SHIPPED | OUTPATIENT
Start: 2019-03-20 | End: 2019-04-19

## 2019-03-20 ASSESSMENT — PATIENT HEALTH QUESTIONNAIRE - PHQ9
SUM OF ALL RESPONSES TO PHQ QUESTIONS 1-9: 0
SUM OF ALL RESPONSES TO PHQ9 QUESTIONS 1 & 2: 0
1. LITTLE INTEREST OR PLEASURE IN DOING THINGS: 0
SUM OF ALL RESPONSES TO PHQ QUESTIONS 1-9: 0
2. FEELING DOWN, DEPRESSED OR HOPELESS: 0

## 2019-03-20 ASSESSMENT — ENCOUNTER SYMPTOMS
COLOR CHANGE: 0
COUGH: 0
EYE DISCHARGE: 0
VOMITING: 0
NAUSEA: 0
BACK PAIN: 0
ABDOMINAL PAIN: 0
DIARRHEA: 0
WHEEZING: 0

## 2019-05-16 ENCOUNTER — OFFICE VISIT (OUTPATIENT)
Dept: PRIMARY CARE CLINIC | Age: 74
End: 2019-05-16
Payer: MEDICARE

## 2019-05-16 VITALS
HEART RATE: 81 BPM | BODY MASS INDEX: 30.43 KG/M2 | SYSTOLIC BLOOD PRESSURE: 110 MMHG | DIASTOLIC BLOOD PRESSURE: 70 MMHG | OXYGEN SATURATION: 98 % | WEIGHT: 171.8 LBS | RESPIRATION RATE: 16 BRPM | TEMPERATURE: 98 F

## 2019-05-16 DIAGNOSIS — N18.30 STAGE 3 CHRONIC KIDNEY DISEASE (HCC): ICD-10-CM

## 2019-05-16 DIAGNOSIS — M06.9 RHEUMATOID ARTHRITIS INVOLVING MULTIPLE SITES, UNSPECIFIED RHEUMATOID FACTOR PRESENCE: Primary | ICD-10-CM

## 2019-05-16 DIAGNOSIS — I10 ESSENTIAL HYPERTENSION: ICD-10-CM

## 2019-05-16 DIAGNOSIS — E03.9 ACQUIRED HYPOTHYROIDISM: ICD-10-CM

## 2019-05-16 PROCEDURE — 1036F TOBACCO NON-USER: CPT | Performed by: FAMILY MEDICINE

## 2019-05-16 PROCEDURE — 4040F PNEUMOC VAC/ADMIN/RCVD: CPT | Performed by: FAMILY MEDICINE

## 2019-05-16 PROCEDURE — 1090F PRES/ABSN URINE INCON ASSESS: CPT | Performed by: FAMILY MEDICINE

## 2019-05-16 PROCEDURE — 1123F ACP DISCUSS/DSCN MKR DOCD: CPT | Performed by: FAMILY MEDICINE

## 2019-05-16 PROCEDURE — G8399 PT W/DXA RESULTS DOCUMENT: HCPCS | Performed by: FAMILY MEDICINE

## 2019-05-16 PROCEDURE — G8427 DOCREV CUR MEDS BY ELIG CLIN: HCPCS | Performed by: FAMILY MEDICINE

## 2019-05-16 PROCEDURE — 99214 OFFICE O/P EST MOD 30 MIN: CPT | Performed by: FAMILY MEDICINE

## 2019-05-16 PROCEDURE — 3017F COLORECTAL CA SCREEN DOC REV: CPT | Performed by: FAMILY MEDICINE

## 2019-05-16 PROCEDURE — G8417 CALC BMI ABV UP PARAM F/U: HCPCS | Performed by: FAMILY MEDICINE

## 2019-05-16 RX ORDER — HYDROCODONE BITARTRATE AND ACETAMINOPHEN 7.5; 325 MG/1; MG/1
TABLET ORAL
COMMUNITY
End: 2019-07-26 | Stop reason: SDUPTHER

## 2019-05-16 ASSESSMENT — ENCOUNTER SYMPTOMS
NAUSEA: 0
VOMITING: 0
WHEEZING: 0
COLOR CHANGE: 0
EYE DISCHARGE: 0
COUGH: 0
ABDOMINAL PAIN: 0
BACK PAIN: 0
DIARRHEA: 0

## 2019-05-16 NOTE — PROGRESS NOTES
SUBJECTIVE:    Patient ID: Tania Calhoun is a 72 y.o. female. HPI:   Hypertension: Patient here for follow-up of elevated blood pressure. Blood pressure is well controlled at home. Cardiac symptoms none. Patient denies chest pain and palpitations. Cardiovascular risk factors: advanced age (older than 54 for men, 72 for women), hypertension and obesity (BMI >= 30 kg/m2). Use of agents associated with hypertension: none. History of target organ damage: none. She is still seeing nephrology for chronic kidney disease. She states that she saw them a couple months ago. She had blood work done. She states that she is currently not having any issues. She did have any replacement done on her left knee. This was done in April. She states that she has done well with recovery. She is moving well. She states that she is not currently doing any physical therapy because he was pleased with her progress. She has a history of rheumatoid arthritis that she gets hydrocodone from us to help with her pain. She only takes this very sparingly when she needs it. She denies any side effects to the medication or any problems. She has not changed the dosage or increased frequency recently. She states that she does not abuse the medication.     Past Medical History:   Diagnosis Date    Abdominal pain, unspecified site     Antral gastritis     Anxiety and depression     Arthritis     Back pain     Chest pressure 05/2017    Decrease in appetite     Ear infection     GERD (gastroesophageal reflux disease)     Heart murmur     History of colon polyps     Mastodynia     rt axilla into breast    Neck pain     Otalgia, unspecified     Rheumatoid arthritis(714.0)     Sigmoid diverticulosis     Unspecified otitis media     Weight loss     30 lbs in 4-5 months      Current Outpatient Medications   Medication Sig Dispense Refill    HYDROcodone-acetaminophen (NORCO) 7.5-325 MG per tablet Norco 7.5 mg-325 mg tablet   Take 1 tablet every 4-6 hours by oral route as needed.  omeprazole (PRILOSEC) 40 MG delayed release capsule TAKE 1 CAPSULE BY MOUTH DAILY 90 capsule 5    metoprolol succinate (TOPROL XL) 50 MG extended release tablet TAKE 1 TABLET BY MOUTH DAILY 90 tablet 3    lisinopril (PRINIVIL;ZESTRIL) 10 MG tablet TAKE 1 TABLET BY MOUTH EVERY DAY 90 tablet 3    lidocaine (LIDODERM) 5 % Place 1 patch onto the skin daily 12 hours on, 12 hours off. 90 patch 3    colchicine (COLCRYS) 0.6 MG tablet Take 1 tablet by mouth daily 1.2 mg po and then 0.6 mg po 1 hour later 3 tablet 2    allopurinol (ZYLOPRIM) 100 MG tablet TAKE 1 TABLET BY MOUTH DAILY 30 tablet 3    levothyroxine (SYNTHROID) 125 MCG tablet TAKE 1 TABLET BY MOUTH EVERY DAY 90 tablet 0    traZODone (DESYREL) 50 MG tablet Take 1 tablet by mouth nightly as needed for Sleep 90 tablet 3    tiZANidine (ZANAFLEX) 4 MG tablet TAKE 1 TABLET BY MOUTH THREE TIMES DAILY 270 tablet 3    oxybutynin (DITROPAN) 5 MG tablet Take 1 tablet by mouth 2 times daily 180 tablet 3    clobetasol (TEMOVATE) 0.05 % ointment Apply topically 2 times daily. (Patient taking differently: Apply topically 2 times daily as needed Apply topically 2 times daily.) 120 g 3    Omega-3 Fatty Acids (FISH OIL BURP-LESS PO) Take by mouth      TRAVATAN Z 0.004 % SOLN ophthalmic solution Place 1 drop into both eyes nightly       acetaminophen (TYLENOL) 325 MG tablet Take 650 mg by mouth every 6 hours as needed for Pain.  calcium carbonate 600 MG TABS tablet Take 1 tablet by mouth daily as needed. No current facility-administered medications for this visit. Allergies   Allergen Reactions    Fish-Derived Products Swelling     Smell of cooking fish causes hives and throat closing off    Pcn [Penicillins] Hives       Review of Systems   Constitutional: Negative for activity change, appetite change and fever. HENT: Negative for congestion and nosebleeds.     Eyes: Negative for

## 2019-05-21 RX ORDER — LEVOTHYROXINE SODIUM 0.12 MG/1
TABLET ORAL
Qty: 90 TABLET | Refills: 3 | Status: SHIPPED | OUTPATIENT
Start: 2019-05-21 | End: 2020-06-18

## 2019-07-15 RX ORDER — OXYBUTYNIN CHLORIDE 5 MG/1
TABLET ORAL
Qty: 180 TABLET | Refills: 0 | Status: SHIPPED | OUTPATIENT
Start: 2019-07-15 | End: 2019-07-18 | Stop reason: SDUPTHER

## 2019-07-18 RX ORDER — OXYBUTYNIN CHLORIDE 5 MG/1
TABLET ORAL
Qty: 180 TABLET | Refills: 0 | Status: SHIPPED | OUTPATIENT
Start: 2019-07-18 | End: 2020-01-16

## 2019-07-26 DIAGNOSIS — M06.9 RHEUMATOID ARTHRITIS INVOLVING MULTIPLE SITES, UNSPECIFIED RHEUMATOID FACTOR PRESENCE: Primary | ICD-10-CM

## 2019-07-29 RX ORDER — HYDROCODONE BITARTRATE AND ACETAMINOPHEN 7.5; 325 MG/1; MG/1
1 TABLET ORAL
Qty: 60 TABLET | Refills: 0 | Status: SHIPPED | OUTPATIENT
Start: 2019-07-29 | End: 2020-06-18 | Stop reason: SDUPTHER

## 2019-07-29 RX ORDER — ALLOPURINOL 100 MG/1
100 TABLET ORAL DAILY
Qty: 30 TABLET | Refills: 0 | Status: SHIPPED | OUTPATIENT
Start: 2019-07-29 | End: 2019-09-04 | Stop reason: SDUPTHER

## 2020-01-16 RX ORDER — OXYBUTYNIN CHLORIDE 5 MG/1
TABLET ORAL
Qty: 180 TABLET | Refills: 3 | Status: SHIPPED | OUTPATIENT
Start: 2020-01-16 | End: 2020-09-28

## 2020-01-16 RX ORDER — ALLOPURINOL 100 MG/1
100 TABLET ORAL DAILY
Qty: 30 TABLET | Refills: 3 | Status: SHIPPED | OUTPATIENT
Start: 2020-01-16 | End: 2020-03-26

## 2020-03-26 RX ORDER — ALLOPURINOL 100 MG/1
100 TABLET ORAL DAILY
Qty: 30 TABLET | Refills: 3 | Status: SHIPPED | OUTPATIENT
Start: 2020-03-26 | End: 2020-04-15

## 2020-04-15 RX ORDER — METOPROLOL SUCCINATE 50 MG/1
50 TABLET, EXTENDED RELEASE ORAL DAILY
Qty: 90 TABLET | Refills: 3 | Status: SHIPPED | OUTPATIENT
Start: 2020-04-15 | End: 2021-03-26

## 2020-04-15 RX ORDER — ALLOPURINOL 100 MG/1
100 TABLET ORAL DAILY
Qty: 30 TABLET | Refills: 3 | Status: SHIPPED | OUTPATIENT
Start: 2020-04-15 | End: 2020-09-28

## 2020-04-15 RX ORDER — OMEPRAZOLE 40 MG/1
40 CAPSULE, DELAYED RELEASE ORAL DAILY
Qty: 90 CAPSULE | Refills: 5 | Status: SHIPPED | OUTPATIENT
Start: 2020-04-15 | End: 2021-06-24

## 2020-05-08 RX ORDER — COLCHICINE 0.6 MG/1
TABLET ORAL
Qty: 3 TABLET | Refills: 2 | Status: SHIPPED | OUTPATIENT
Start: 2020-05-08

## 2020-06-18 ENCOUNTER — OFFICE VISIT (OUTPATIENT)
Dept: PRIMARY CARE CLINIC | Age: 75
End: 2020-06-18
Payer: MEDICARE

## 2020-06-18 VITALS
HEIGHT: 64 IN | HEART RATE: 57 BPM | DIASTOLIC BLOOD PRESSURE: 70 MMHG | OXYGEN SATURATION: 98 % | RESPIRATION RATE: 16 BRPM | BODY MASS INDEX: 27.21 KG/M2 | WEIGHT: 159.4 LBS | TEMPERATURE: 97.6 F | SYSTOLIC BLOOD PRESSURE: 130 MMHG

## 2020-06-18 PROCEDURE — 1090F PRES/ABSN URINE INCON ASSESS: CPT | Performed by: FAMILY MEDICINE

## 2020-06-18 PROCEDURE — G8399 PT W/DXA RESULTS DOCUMENT: HCPCS | Performed by: FAMILY MEDICINE

## 2020-06-18 PROCEDURE — 3017F COLORECTAL CA SCREEN DOC REV: CPT | Performed by: FAMILY MEDICINE

## 2020-06-18 PROCEDURE — 1036F TOBACCO NON-USER: CPT | Performed by: FAMILY MEDICINE

## 2020-06-18 PROCEDURE — 1123F ACP DISCUSS/DSCN MKR DOCD: CPT | Performed by: FAMILY MEDICINE

## 2020-06-18 PROCEDURE — 4040F PNEUMOC VAC/ADMIN/RCVD: CPT | Performed by: FAMILY MEDICINE

## 2020-06-18 PROCEDURE — G8427 DOCREV CUR MEDS BY ELIG CLIN: HCPCS | Performed by: FAMILY MEDICINE

## 2020-06-18 PROCEDURE — G8417 CALC BMI ABV UP PARAM F/U: HCPCS | Performed by: FAMILY MEDICINE

## 2020-06-18 PROCEDURE — 99214 OFFICE O/P EST MOD 30 MIN: CPT | Performed by: FAMILY MEDICINE

## 2020-06-18 RX ORDER — LEVOTHYROXINE SODIUM 0.1 MG/1
100 TABLET ORAL DAILY
COMMUNITY
End: 2021-04-13 | Stop reason: SDUPTHER

## 2020-06-18 RX ORDER — MULTIVITAMIN WITH IRON
250 TABLET ORAL DAILY
COMMUNITY
End: 2021-01-12

## 2020-06-18 RX ORDER — HYDROCODONE BITARTRATE AND ACETAMINOPHEN 7.5; 325 MG/1; MG/1
1 TABLET ORAL
Qty: 60 TABLET | Refills: 0 | Status: SHIPPED | OUTPATIENT
Start: 2020-06-18 | End: 2021-01-12 | Stop reason: SDUPTHER

## 2020-06-18 RX ORDER — LIDOCAINE 50 MG/G
1 PATCH TOPICAL DAILY
Qty: 90 PATCH | Refills: 3 | Status: SHIPPED | OUTPATIENT
Start: 2020-06-18 | End: 2021-12-28 | Stop reason: SDUPTHER

## 2020-06-18 RX ORDER — HYDROCODONE BITARTRATE AND ACETAMINOPHEN 5; 325 MG/1; MG/1
1 TABLET ORAL DAILY PRN
COMMUNITY
Start: 2020-05-08 | End: 2021-04-13

## 2020-06-18 ASSESSMENT — PATIENT HEALTH QUESTIONNAIRE - PHQ9
2. FEELING DOWN, DEPRESSED OR HOPELESS: 0
1. LITTLE INTEREST OR PLEASURE IN DOING THINGS: 0
SUM OF ALL RESPONSES TO PHQ QUESTIONS 1-9: 0
SUM OF ALL RESPONSES TO PHQ QUESTIONS 1-9: 0
SUM OF ALL RESPONSES TO PHQ9 QUESTIONS 1 & 2: 0

## 2020-06-18 ASSESSMENT — ENCOUNTER SYMPTOMS
COUGH: 0
BACK PAIN: 1
WHEEZING: 0
NAUSEA: 0
EYE DISCHARGE: 0
COLOR CHANGE: 0
ABDOMINAL PAIN: 0
VOMITING: 0
DIARRHEA: 0

## 2020-06-18 NOTE — PROGRESS NOTES
SUBJECTIVE:    Patient ID: Ban Sousa is a 70 y.o. female. HPI:   Patient presents today for a follow-up. It is been a while since she has been seen. She is having problems with chronic left knee pain. She has had a knee replacement done in the orthopedist said that the knee had healed well. She does have a history of rheumatoid arthritis and he has her set up to see a rheumatologist in July. She states that she is having a lot of aches and cramps. She states that she is wanting to know she can get some pain medication to help with the left knee pain and chronic back pain. She only takes this intermittently when she needs it. She does have a history of obstructive sleep apnea. She has just recently gotten a new CPAP machine. She states that she is going to be using this nightly. She states her old machine had broken and so she had stopped using it because the humidifier was blowing air back in her face. She states that she is staying tired. She states that she is only sleeping about 4 hours per night. She does have a history of hypertension. She states that she is taking her blood pressure medication as prescribed. She denies any chest pains or palpitations. She states that she is not monitoring her blood pressure at home.       Past Medical History:   Diagnosis Date    Abdominal pain, unspecified site     Antral gastritis     Anxiety and depression     Arthritis     Back pain     Chest pressure 05/2017    Decrease in appetite     Ear infection     GERD (gastroesophageal reflux disease)     Heart murmur     History of colon polyps     Mastodynia     rt axilla into breast    Neck pain     Otalgia, unspecified     Rheumatoid arthritis(714.0)     Sigmoid diverticulosis     Unspecified otitis media     Weight loss     30 lbs in 4-5 months      Current Outpatient Medications   Medication Sig Dispense Refill    magnesium (MAGNESIUM-OXIDE) 250 MG TABS tablet Take 250 mg by mouth daily      levothyroxine (SYNTHROID) 100 MCG tablet Take 100 mcg by mouth Daily      lidocaine (LIDODERM) 5 % Place 1 patch onto the skin daily 12 hours on, 12 hours off. 90 patch 3    HYDROcodone-acetaminophen (NORCO) 7.5-325 MG per tablet Take 1 tablet by mouth every 4-6 hours as needed for Pain for up to 30 days. 60 tablet 0    colchicine (COLCRYS) 0.6 MG tablet TAKE 2 TABLETS BY MOUTH NOW, THEN 1 TABLET 1 HOUR LATER 3 tablet 2    allopurinol (ZYLOPRIM) 100 MG tablet TAKE 1 TABLET BY MOUTH DAILY 30 tablet 3    metoprolol succinate (TOPROL XL) 50 MG extended release tablet TAKE 1 TABLET BY MOUTH DAILY 90 tablet 3    omeprazole (PRILOSEC) 40 MG delayed release capsule TAKE 1 CAPSULE BY MOUTH DAILY 90 capsule 5    oxybutynin (DITROPAN) 5 MG tablet TAKE 1 TABLET BY MOUTH TWICE DAILY 180 tablet 3    clobetasol (TEMOVATE) 0.05 % ointment Apply topically 2 times daily. (Patient taking differently: Apply topically 2 times daily as needed Apply topically 2 times daily.) 120 g 3    Omega-3 Fatty Acids (FISH OIL BURP-LESS PO) Take by mouth      calcium carbonate 600 MG TABS tablet Take 1 tablet by mouth daily as needed.  HYDROcodone-acetaminophen (NORCO) 5-325 MG per tablet Take 1 tablet by mouth daily as needed. No current facility-administered medications for this visit. Allergies   Allergen Reactions    Fish-Derived Products Swelling     Smell of cooking fish causes hives and throat closing off    Pcn [Penicillins] Hives       Review of Systems   Constitutional: Negative for activity change, appetite change and fever. HENT: Negative for congestion and nosebleeds. Eyes: Negative for discharge. Respiratory: Negative for cough and wheezing. Cardiovascular: Negative for chest pain and leg swelling. Gastrointestinal: Negative for abdominal pain, diarrhea, nausea and vomiting. Genitourinary: Negative for difficulty urinating, frequency and urgency.    Musculoskeletal: Positive for arthralgias and back pain. Negative for gait problem. Skin: Negative for color change and rash. Neurological: Negative for dizziness and headaches. Hematological: Does not bruise/bleed easily. Psychiatric/Behavioral: Negative for sleep disturbance and suicidal ideas. OBJECTIVE:     Physical Exam  Vitals signs reviewed. Constitutional:       General: She is not in acute distress. Appearance: Normal appearance. She is well-developed. She is not diaphoretic. HENT:      Head: Normocephalic and atraumatic. Right Ear: External ear normal.      Left Ear: External ear normal.   Neck:      Musculoskeletal: Normal range of motion and neck supple. Cardiovascular:      Rate and Rhythm: Normal rate and regular rhythm. Pulses: Normal pulses. Heart sounds: Normal heart sounds. No murmur. Pulmonary:      Effort: Pulmonary effort is normal. No respiratory distress. Breath sounds: Normal breath sounds. Skin:     General: Skin is warm and dry. Neurological:      General: No focal deficit present. Mental Status: She is alert and oriented to person, place, and time. Mental status is at baseline. Psychiatric:         Mood and Affect: Mood normal.         Behavior: Behavior normal.         Thought Content: Thought content normal.         Judgment: Judgment normal.        /70   Pulse 57   Temp 97.6 °F (36.4 °C)   Resp 16   Ht 5' 3.5\" (1.613 m)   Wt 159 lb 6.4 oz (72.3 kg)   SpO2 98%   BMI 27.79 kg/m²      ASSESSMENT:    Jocelyn Barclay was seen today for equipment request.    Diagnoses and all orders for this visit:    Acquired hypothyroidism  -     CBC Auto Differential; Future  -     Comprehensive Metabolic Panel; Future  -     Lipid Panel; Future  -     TSH without Reflex;  Future  -     T4, Free; Future    Back pain with radiation  -     lidocaine (LIDODERM) 5 %; Place 1 patch onto the skin daily 12 hours on, 12 hours off.  -     CBC Auto Differential; Future  - Comprehensive Metabolic Panel; Future  -     Lipid Panel; Future  -     TSH without Reflex; Future  -     T4, Free; Future    Rheumatoid arthritis involving multiple sites, unspecified rheumatoid factor presence (HCC)  -     HYDROcodone-acetaminophen (NORCO) 7.5-325 MG per tablet; Take 1 tablet by mouth every 4-6 hours as needed for Pain for up to 30 days. -     CBC Auto Differential; Future  -     Comprehensive Metabolic Panel; Future  -     Lipid Panel; Future  -     TSH without Reflex; Future  -     T4, Free; Future    Essential hypertension  -     CBC Auto Differential; Future  -     Comprehensive Metabolic Panel; Future  -     Lipid Panel; Future  -     TSH without Reflex; Future  -     T4, Free; Future    At high risk for falls        PLAN:    See lab orders. Will notify results. Continue current medications. Follow-up with us in 6 months for checkup unless needed sooner. Render Johanny requested. EMR Dragon/transcription disclaimer:  Much of this encounter note is electronic transcription/translation of spoken language toprinted texts. The electronic translation of spoken language may be erroneous, or at times, nonsensical words or phrases may be inadvertently transcribed.   Although I have reviewed the note for such errors, some may stillexist.

## 2020-06-18 NOTE — PROGRESS NOTES
On the basis of positive falls risk screening, assessment and plan is as follows: sees orthopedics for knee.

## 2020-06-19 ENCOUNTER — TELEPHONE (OUTPATIENT)
Dept: PRIMARY CARE CLINIC | Age: 75
End: 2020-06-19

## 2020-06-19 DIAGNOSIS — R30.0 BURNING WITH URINATION: Primary | ICD-10-CM

## 2020-09-09 ENCOUNTER — VIRTUAL VISIT (OUTPATIENT)
Dept: PRIMARY CARE CLINIC | Age: 75
End: 2020-09-09
Payer: MEDICARE

## 2020-09-09 VITALS
WEIGHT: 156 LBS | SYSTOLIC BLOOD PRESSURE: 122 MMHG | DIASTOLIC BLOOD PRESSURE: 73 MMHG | HEIGHT: 64 IN | BODY MASS INDEX: 26.63 KG/M2

## 2020-09-09 PROCEDURE — G0439 PPPS, SUBSEQ VISIT: HCPCS | Performed by: FAMILY MEDICINE

## 2020-09-09 PROCEDURE — 4040F PNEUMOC VAC/ADMIN/RCVD: CPT | Performed by: FAMILY MEDICINE

## 2020-09-09 PROCEDURE — 1123F ACP DISCUSS/DSCN MKR DOCD: CPT | Performed by: FAMILY MEDICINE

## 2020-09-09 PROCEDURE — 3017F COLORECTAL CA SCREEN DOC REV: CPT | Performed by: FAMILY MEDICINE

## 2020-09-09 RX ORDER — DULOXETIN HYDROCHLORIDE 20 MG/1
30 CAPSULE, DELAYED RELEASE ORAL
COMMUNITY
End: 2021-03-01 | Stop reason: SDUPTHER

## 2020-09-09 RX ORDER — TRAZODONE HYDROCHLORIDE 50 MG/1
100 TABLET ORAL NIGHTLY
COMMUNITY
End: 2021-03-01 | Stop reason: SDUPTHER

## 2020-09-09 RX ORDER — TRAMADOL HYDROCHLORIDE 50 MG/1
50 TABLET ORAL EVERY 6 HOURS PRN
COMMUNITY
End: 2021-01-12

## 2020-09-09 ASSESSMENT — PATIENT HEALTH QUESTIONNAIRE - PHQ9
SUM OF ALL RESPONSES TO PHQ QUESTIONS 1-9: 0
1. LITTLE INTEREST OR PLEASURE IN DOING THINGS: 0
SUM OF ALL RESPONSES TO PHQ9 QUESTIONS 1 & 2: 0
SUM OF ALL RESPONSES TO PHQ QUESTIONS 1-9: 0
2. FEELING DOWN, DEPRESSED OR HOPELESS: 0

## 2020-09-09 ASSESSMENT — LIFESTYLE VARIABLES: HOW OFTEN DO YOU HAVE A DRINK CONTAINING ALCOHOL: 0

## 2020-09-09 NOTE — PATIENT INSTRUCTIONS
Personalized Preventive Plan for Tenzin Clas - 9/9/2020  Medicare offers a range of preventive health benefits. Some of the tests and screenings are paid in full while other may be subject to a deductible, co-insurance, and/or copay. Some of these benefits include a comprehensive review of your medical history including lifestyle, illnesses that may run in your family, and various assessments and screenings as appropriate. After reviewing your medical record and screening and assessments performed today your provider may have ordered immunizations, labs, imaging, and/or referrals for you. A list of these orders (if applicable) as well as your Preventive Care list are included within your After Visit Summary for your review. Other Preventive Recommendations:    · A preventive eye exam performed by an eye specialist is recommended every 1-2 years to screen for glaucoma; cataracts, macular degeneration, and other eye disorders. · A preventive dental visit is recommended every 6 months. · Try to get at least 150 minutes of exercise per week or 10,000 steps per day on a pedometer . · Order or download the FREE \"Exercise & Physical Activity: Your Everyday Guide\" from [employer]. Call 0-189.540.6954 or search [employer] online. · You need 0328-9012 mg of calcium and 6300-0870 IU of vitamin D per day. It is possible to meet your calcium requirement with diet alone, but a vitamin D supplement is usually necessary to meet this goal.  · When exposed to the sun, use a sunscreen that protects against both UVA and UVB radiation with an SPF of 30 or greater. Reapply every 2 to 3 hours or after sweating, drying off with a towel, or swimming. · Always wear a seat belt when traveling in a car. Always wear a helmet when riding a bicycle or motorcycle.

## 2020-09-09 NOTE — PROGRESS NOTES
Medicare Annual Wellness Visit  Name: Jabari Floyd Date: 2020   MRN: [id number] Sex: Female   Age: 70 y.o. Ethnicity: Non-/Non    : 1945 Race: Taisha Whittaker is here for Medicare AWV    Screenings for behavioral, psychosocial and functional/safety risks, and cognitive dysfunction are all negative except as indicated below. These results, as well as other patient data from the [address] form, are documented in Flowsheets linked to this Encounter. Allergies   Allergen Reactions    Fish-Derived Products Swelling     Smell of cooking fish causes hives and throat closing off    Pcn [Penicillins] Hives       Prior to Visit Medications    Medication Sig Taking? Authorizing Provider   DULoxetine (CYMBALTA) 20 MG extended release capsule Take 30 mg by mouth Yes Historical Provider, MD   linaclotide (LINZESS) 145 MCG capsule Take 145 mcg by mouth every morning (before breakfast) Yes Historical Provider, MD   traMADol (ULTRAM) 50 MG tablet Take 50 mg by mouth every 6 hours as needed for Pain. Yes Historical Provider, MD   traZODone (DESYREL) 50 MG tablet Take 50 mg by mouth nightly Yes Historical Provider, MD   levothyroxine (SYNTHROID) 100 MCG tablet Take 100 mcg by mouth Daily Yes Historical Provider, MD   lidocaine (LIDODERM) 5 % Place 1 patch onto the skin daily 12 hours on, 12 hours off.  Yes Kimmie Blandon MD   colchicine (COLCRYS) 0.6 MG tablet TAKE 2 TABLETS BY MOUTH NOW, THEN 1 TABLET 1 HOUR LATER  Patient taking differently: as needed  Yes Kimmie Blandon MD   allopurinol (ZYLOPRIM) 100 MG tablet TAKE 1 TABLET BY MOUTH DAILY Yes Naty Forman MD   metoprolol succinate (TOPROL XL) 50 MG extended release tablet TAKE 1 TABLET BY MOUTH DAILY Yes Kimmie Blandon MD   omeprazole (PRILOSEC) 40 MG delayed release capsule TAKE 1 CAPSULE BY MOUTH DAILY Yes Naty Forman MD   oxybutynin (DITROPAN) 5 MG tablet TAKE 1 Paternal Uncle     Colon Polyps Paternal Uncle     Diabetes Sister        Heath (Including outside providers/suppliers regularly involved in providing care):   Patient Care Team:  Blair Kidd MD as PCP - General (Family Medicine)  Blair Kidd [facility]  Capri Sue MD as Consulting Physician (Interventional Cardiology)    Wt Readings from Last 3 Encounters:   09/09/20 156 lb (70.8 kg)   06/18/20 159 lb 6.4 oz (72.3 kg)   05/16/19 171 lb 12.8 oz (77.9 kg)     Vitals:    09/09/20 1059   BP: 122/73   Weight: 156 lb (70.8 kg)   Height: 5' 3.5\" (1.613 m)     Body mass index is 27.2 kg/m². Based upon direct observation of the patient, evaluation of cognition reveals recent and remote memory intact. Patient's complete Health Risk Assessment and screening values have been reviewed and are found in Flowsheets. The following problems were reviewed today and where indicated follow up appointments were made and/or referrals ordered. Positive Risk Factor Screenings with Interventions:     General Health:  General  In general, how would you say your health is?: Very Good  In the past 7 days, have you experienced any of the following? New or Increased Pain, New or Increased Fatigue, Loneliness, Social Isolation, Stress or Anger?: (!) New or Increased Fatigue(Reports new fatigue, reports she is staying up later. Mony De Leon )  Do you get the social and emotional support that you need?: Yes  Do you have a Living Will?: Yes  General Health Risk Interventions:  · Fatigue: patient declines any further evaluation/treatment for this issue    Hearing/Vision:  No exam data present  Hearing/Vision  Do you or your family notice any trouble with your hearing?: (!) Yes(Reports she is hard of hearing, mianly on the right side.)  Do you have difficulty driving, watching TV, or doing any of your daily activities because of your eyesight?: No  Have you had an eye exam within the past year?: Yes  Hearing/Vision Interventions:  · Hearing concerns:  patient declines any further evaluation/treatment for hearing issues    Personalized Preventive Plan   Current Health Maintenance Status  Immunization History   Administered Date(s) Administered    Influenza Virus Vaccine 11/19/2013    Influenza Whole 10/22/2015    Influenza, High Dose (Fluzone 65 yrs and older) 11/01/2018    Pneumococcal Conjugate 13-valent ([id number]) 11/16/2015    Pneumococcal Polysaccharide ([id number]) 08/01/2017    Tdap (Boostrix, Adacel) 11/19/2013        Health Maintenance   Topic Date Due    Hepatitis C screen  1945    Shingles Vaccine (1 of 2) 05/19/1997    Annual Wellness Visit (AWV)  05/29/2019    Lipid screen  07/14/2019    Breast cancer screen  12/17/2019    Flu vaccine (1) 09/01/2020    Colon cancer screen colonoscopy  09/12/2022    DTaP/Tdap/Td vaccine (2 - Td) 11/19/2023    DEXA (modify frequency per FRAX score)  Completed    Pneumococcal 65+ years Vaccine  Completed    Hepatitis A vaccine  Aged Out    Hepatitis B vaccine  Aged Out    Hib vaccine  Aged Out    Meningococcal (ACWY) vaccine  Aged Out     Recommendations for [employer] Due: see orders and patient instructions/AVS.  . Health Maintenance Plan reviewed with patient. Patient wants to wait past covid to have any testing. Recommended screening schedule for the next 5-10 years is provided to the patient in written form: see Patient Instructions/AVS.    Ligia PALOMINO LPN, 3/8/[birth year]199, performed the documented evaluation under the direct supervision of the attending physician. Ruth Manrique is a 70 y.o. female evaluated via telephone on 9/9/2020.       Consent:  She and/or health care decision maker is aware that that she may receive a bill for this telephone service, depending on her insurance coverage, and has provided verbal consent to proceed: Yes      Documentation:  I communicated with the patient and/or health care decision maker about AWV. Details of this discussion including any medical advice provided. I affirm this is a Patient Initiated Episode with a Patient who has not had a related appointment within my department in the past 7 days or scheduled within the next 24 hours.     Patient identification was verified at the start of the visit: Yes    Total Time: minutes: 21-30 minutes    Note: not billable if this call serves to triage the patient into an appointment for the relevant concern      Nicole Baez

## 2020-09-28 RX ORDER — ALLOPURINOL 100 MG/1
100 TABLET ORAL DAILY
Qty: 30 TABLET | Refills: 3 | Status: SHIPPED | OUTPATIENT
Start: 2020-09-28 | End: 2020-12-28

## 2020-09-28 RX ORDER — OXYBUTYNIN CHLORIDE 5 MG/1
TABLET ORAL
Qty: 180 TABLET | Refills: 3 | Status: SHIPPED | OUTPATIENT
Start: 2020-09-28 | End: 2021-04-13

## 2020-12-28 RX ORDER — ALLOPURINOL 100 MG/1
100 TABLET ORAL DAILY
Qty: 30 TABLET | Refills: 3 | Status: SHIPPED | OUTPATIENT
Start: 2020-12-28 | End: 2021-01-05 | Stop reason: SDUPTHER

## 2021-01-05 RX ORDER — ALLOPURINOL 100 MG/1
100 TABLET ORAL DAILY
Qty: 90 TABLET | Refills: 3 | Status: SHIPPED | OUTPATIENT
Start: 2021-01-05 | End: 2021-10-13 | Stop reason: SDUPTHER

## 2021-01-12 ENCOUNTER — OFFICE VISIT (OUTPATIENT)
Dept: PRIMARY CARE CLINIC | Age: 76
End: 2021-01-12
Payer: MEDICARE

## 2021-01-12 VITALS
BODY MASS INDEX: 28.51 KG/M2 | SYSTOLIC BLOOD PRESSURE: 136 MMHG | DIASTOLIC BLOOD PRESSURE: 82 MMHG | WEIGHT: 167 LBS | TEMPERATURE: 96.5 F | HEART RATE: 80 BPM | HEIGHT: 64 IN | RESPIRATION RATE: 16 BRPM

## 2021-01-12 DIAGNOSIS — R39.15 URINARY URGENCY: ICD-10-CM

## 2021-01-12 DIAGNOSIS — E03.9 ACQUIRED HYPOTHYROIDISM: ICD-10-CM

## 2021-01-12 DIAGNOSIS — M06.9 RHEUMATOID ARTHRITIS INVOLVING MULTIPLE SITES, UNSPECIFIED WHETHER RHEUMATOID FACTOR PRESENT (HCC): Primary | ICD-10-CM

## 2021-01-12 DIAGNOSIS — Z12.31 VISIT FOR SCREENING MAMMOGRAM: ICD-10-CM

## 2021-01-12 DIAGNOSIS — N18.30 STAGE 3 CHRONIC KIDNEY DISEASE, UNSPECIFIED WHETHER STAGE 3A OR 3B CKD (HCC): ICD-10-CM

## 2021-01-12 DIAGNOSIS — R42 DIZZINESS: ICD-10-CM

## 2021-01-12 DIAGNOSIS — I10 ESSENTIAL HYPERTENSION: ICD-10-CM

## 2021-01-12 DIAGNOSIS — H92.02 ACUTE OTALGIA, LEFT: ICD-10-CM

## 2021-01-12 PROCEDURE — 1123F ACP DISCUSS/DSCN MKR DOCD: CPT | Performed by: FAMILY MEDICINE

## 2021-01-12 PROCEDURE — 99214 OFFICE O/P EST MOD 30 MIN: CPT | Performed by: FAMILY MEDICINE

## 2021-01-12 PROCEDURE — G8399 PT W/DXA RESULTS DOCUMENT: HCPCS | Performed by: FAMILY MEDICINE

## 2021-01-12 PROCEDURE — 4040F PNEUMOC VAC/ADMIN/RCVD: CPT | Performed by: FAMILY MEDICINE

## 2021-01-12 PROCEDURE — 3017F COLORECTAL CA SCREEN DOC REV: CPT | Performed by: FAMILY MEDICINE

## 2021-01-12 PROCEDURE — G8482 FLU IMMUNIZE ORDER/ADMIN: HCPCS | Performed by: FAMILY MEDICINE

## 2021-01-12 PROCEDURE — 1036F TOBACCO NON-USER: CPT | Performed by: FAMILY MEDICINE

## 2021-01-12 PROCEDURE — G8427 DOCREV CUR MEDS BY ELIG CLIN: HCPCS | Performed by: FAMILY MEDICINE

## 2021-01-12 PROCEDURE — G8417 CALC BMI ABV UP PARAM F/U: HCPCS | Performed by: FAMILY MEDICINE

## 2021-01-12 PROCEDURE — 1090F PRES/ABSN URINE INCON ASSESS: CPT | Performed by: FAMILY MEDICINE

## 2021-01-12 RX ORDER — HYDROCODONE BITARTRATE AND ACETAMINOPHEN 7.5; 325 MG/1; MG/1
1 TABLET ORAL
Qty: 60 TABLET | Refills: 0 | Status: SHIPPED | OUTPATIENT
Start: 2021-01-12 | End: 2022-04-05 | Stop reason: SDUPTHER

## 2021-01-22 ASSESSMENT — ENCOUNTER SYMPTOMS
EYE DISCHARGE: 0
BACK PAIN: 1
DIARRHEA: 0
ABDOMINAL PAIN: 0
COUGH: 0
WHEEZING: 0
VOMITING: 0
COLOR CHANGE: 0
NAUSEA: 0

## 2021-01-22 NOTE — PROGRESS NOTES
SUBJECTIVE:    Patient ID: Jose E Nagy is a 70 y.o. female. HPI:   Patient presents today for 6-month follow-up. She has a history of chronic kidney disease and sees [facility]. She states that they were seeing her once a year. She states that she has not had blood work done recently. She states that she has not had any worsening swelling. She denies any decrease in urine. She has had some urinary urgency over the past few weeks. She states that she is not noticed any burning when she urinates. She does have a history of hypothyroidism. She is currently on Synthroid 100 mcg. She is tolerating this dose well. She denies any changes in her dose recently. She does have a history of rheumatoid arthritis. She is having a lot of joint aches and pains and she states that the aches and pains do make it difficult for her to get around and do things. She does take hydrocodone as needed. She states that she typically can make a prescription last for a while and is wanting to know if we can refill this for her today. She states that she does take it sporadically and does not take it all the time. She states that if she has to be up moving around that her knees and hips really bother her from the arthritis. She does have a history of hypertension. She is currently on Toprol-XL 50 mg. She is tolerating this well denies any change in his recently. They do monitor her blood pressure at home and states that it stays well controlled. She does have a history of dizziness. She states that this is been going on for the last couple of months. She states that she is not sure she has an ear infection. She does have some left ear pain and fullness. She states that it has not drained anything. She states that she has not been taking anything for the ear pain.     Past Medical History:   Diagnosis Date    Abdominal pain, unspecified site     Antral gastritis     Anxiety and depression  Arthritis     Back pain     Chest pressure 05/2017    Decrease in appetite     Ear infection     GERD (gastroesophageal reflux disease)     Heart murmur     History of colon polyps     Mastodynia     rt axilla into breast    Neck pain     Otalgia, unspecified     Rheumatoid arthritis(714.0)     Sigmoid diverticulosis     Unspecified otitis media     Weight loss     30 lbs in 4-5 months      Current Outpatient Medications   Medication Sig Dispense Refill    HYDROcodone-acetaminophen (NORCO) 7.5-325 MG per tablet Take 1 tablet by mouth every 4-6 hours as needed for Pain for up to 30 days. 60 tablet 0    allopurinol (ZYLOPRIM) 100 MG tablet Take 1 tablet by mouth daily 90 tablet 3    oxybutynin (DITROPAN) 5 MG tablet TAKE 1 TABLET BY MOUTH TWICE DAILY 180 tablet 3    DULoxetine (CYMBALTA) 20 MG extended release capsule Take 30 mg by mouth      linaclotide (LINZESS) 145 MCG capsule Take 290 mcg by mouth every morning (before breakfast)       traZODone (DESYREL) 50 MG tablet Take 100 mg by mouth nightly       HYDROcodone-acetaminophen (NORCO) 5-325 MG per tablet Take 1 tablet by mouth daily as needed.  levothyroxine (SYNTHROID) 100 MCG tablet Take 100 mcg by mouth Daily      lidocaine (LIDODERM) 5 % Place 1 patch onto the skin daily 12 hours on, 12 hours off. 90 patch 3    colchicine (COLCRYS) 0.6 MG tablet TAKE 2 TABLETS BY MOUTH NOW, THEN 1 TABLET 1 HOUR LATER (Patient taking differently: as needed ) 3 tablet 2    metoprolol succinate (TOPROL XL) 50 MG extended release tablet TAKE 1 TABLET BY MOUTH DAILY 90 tablet 3    omeprazole (PRILOSEC) 40 MG delayed release capsule TAKE 1 CAPSULE BY MOUTH DAILY 90 capsule 5    clobetasol (TEMOVATE) 0.05 % ointment Apply topically 2 times daily.  (Patient taking differently: Apply topically 2 times daily as needed Apply topically 2 times daily.) 120 g 3    Omega-3 Fatty Acids (FISH OIL BURP-LESS PO) Take by mouth  calcium carbonate 600 MG TABS tablet Take 1 tablet by mouth daily as needed. No current facility-administered medications for this visit. Allergies   Allergen Reactions    Fish-Derived Products Swelling     Smell of cooking fish causes hives and throat closing off    Pcn [Penicillins] Hives       Review of Systems   Constitutional: Negative for activity change, appetite change and fever. HENT: Positive for ear pain. Negative for congestion and nosebleeds. Eyes: Negative for discharge. Respiratory: Negative for cough and wheezing. Cardiovascular: Negative for chest pain and leg swelling. Gastrointestinal: Negative for abdominal pain, diarrhea, nausea and vomiting. Genitourinary: Negative for difficulty urinating, frequency and urgency. Musculoskeletal: Positive for arthralgias, back pain and neck pain. Negative for gait problem. Skin: Negative for color change and rash. Neurological: Positive for dizziness. Negative for headaches. Hematological: Does not bruise/bleed easily. Psychiatric/Behavioral: Negative for sleep disturbance and suicidal ideas. OBJECTIVE:     Physical Exam  Vitals signs reviewed. Constitutional:       General: She is not in acute distress. Appearance: Normal appearance. She is well-developed. She is not diaphoretic. HENT:      Head: Normocephalic and atraumatic. Right Ear: Tympanic membrane and external ear normal.      Left Ear: Tympanic membrane and external ear normal.   Neck:      Musculoskeletal: Normal range of motion and neck supple. Cardiovascular:      Rate and Rhythm: Normal rate and regular rhythm. Pulses: Normal pulses. Heart sounds: Normal heart sounds. No murmur. Pulmonary:      Effort: Pulmonary effort is normal. No respiratory distress. Breath sounds: Normal breath sounds. Abdominal:      General: Abdomen is flat. Bowel sounds are normal.      Palpations: Abdomen is soft. Tenderness: There is no abdominal tenderness. Skin:     General: Skin is warm and dry. Neurological:      General: No focal deficit present. Mental Status: She is alert and oriented to person, place, and time. Mental status is at baseline. Psychiatric:         Mood and Affect: Mood normal.         Behavior: Behavior normal.         Thought Content: Thought content normal.         Judgment: Judgment normal.        /82 (Site: Left Upper Arm, Position: Sitting, Cuff Size: Medium Adult)   Pulse 80   Temp 96.5 °F (35.8 °C) (Temporal)   Resp 16   Ht 5' 3.5\" (1.613 m)   Wt 167 lb (75.8 kg)   BMI 29.12 kg/m²      ASSESSMENT:    Bo Meeks was seen today for 6 month follow-up, dizziness and otalgia. Diagnoses and all orders for this visit:    [facility]umat[facility] sites [facility]  -     HYDROcodone-acetaminophen ([address]) 7.5-325 MG per tablet; Take 1 tablet by mouth every 4-6 hours as needed for Pain for up to 30 days. -     CBC Auto Differential; Future  -     Comprehensive Metabolic Panel; Future  -     Lipid Panel; Future  -     TSH without Reflex; Future  -     Vitamin D 25 Hydroxy; Future  -     Urinalysis Reflex to Culture; Future    Stage 3 chronic kidney disease, unspecified whether stage 3a or 3b CKD  -     CBC Auto Differential; Future  -     Comprehensive Metabolic Panel; Future  -     Lipid Panel; Future  -     TSH without Reflex; Future  -     Vitamin D 25 Hydroxy; Future  -     Urinalysis Reflex to Culture; Future    Urinary urgency  -     CBC Auto Differential; Future  -     Comprehensive Metabolic Panel; Future  -     Lipid Panel; Future  -     TSH without Reflex; Future  -     Vitamin D 25 Hydroxy; Future  -     Urinalysis Reflex to Culture; Future    Acquired hypothyroidism  -     CBC Auto Differential; Future  -     Comprehensive Metabolic Panel; Future  -     Lipid Panel; Future  -     TSH without Reflex; Future  -     Vitamin D 25 Hydroxy;  Future -     Urinalysis Reflex to Culture; Future    Essential hypertension  -     CBC Auto Differential; Future  -     Comprehensive Metabolic Panel; Future  -     Lipid Panel; Future  -     TSH without Reflex; Future  -     Vitamin D 25 Hydroxy; Future  -     Urinalysis Reflex to Culture; Future    Dizziness  -     US CAROTID ARTERY BILATERAL; Future    Acute otalgia, left    Visit for screening mammogram  -     RANDALL DIGITAL SCREEN W OR WO CAD BILATERAL; Future  -     CBC Auto Differential; Future  -     Comprehensive Metabolic Panel; Future  -     Lipid Panel; Future  -     TSH without Reflex; Future  -     Vitamin D 25 Hydroxy; Future  -     Urinalysis Reflex to Culture; Future        PLAN:    I would like for her to keep her routine follow-up with nephrology regarding her chronic kidney disease. We are going to check labs on her today for her hypothyroidism hypertension and chronic kidney disease. I am refilling her hydrocodone today for her rheumatoid arthritis and her aches and pains. I have encouraged her to only take this as needed. Curly tSroud was requested. Follow-up with us in 6 months for checkup and Medicare annual wellness unless needed sooner. Urinalysis was obtained today which was negative for infection due to the urinary urgency. EMR Dragon/transcription disclaimer:  Much of this encounter note is electronic transcription/translation of spoken language toprinted texts. The electronic translation of spoken language may be erroneous, or at times, nonsensical words or phrases may be inadvertently transcribed.   Although I have reviewed the note for such errors, some may stillexist.

## 2021-02-26 ENCOUNTER — TELEPHONE (OUTPATIENT)
Dept: PRIMARY CARE CLINIC | Age: 76
End: 2021-02-26

## 2021-02-26 NOTE — TELEPHONE ENCOUNTER
Pt states she saw 2 other doctors in [place] and they had prescribed duloxetine 30 mg and trazodone 100 mg. She is needing a refill but her med list on our end show different dosages than what she states she is taking. She states she wishes to have PCP take over RX.

## 2021-03-01 RX ORDER — DULOXETIN HYDROCHLORIDE 30 MG/1
30 CAPSULE, DELAYED RELEASE ORAL DAILY
Qty: 30 CAPSULE | Refills: 5 | Status: SHIPPED | OUTPATIENT
Start: 2021-03-01 | End: 2021-04-13 | Stop reason: SDUPTHER

## 2021-03-01 RX ORDER — TRAZODONE HYDROCHLORIDE 100 MG/1
100 TABLET ORAL NIGHTLY
Qty: 30 TABLET | Refills: 5 | Status: SHIPPED | OUTPATIENT
Start: 2021-03-01 | End: 2021-04-13 | Stop reason: SDUPTHER

## 2021-04-13 ENCOUNTER — OFFICE VISIT (OUTPATIENT)
Dept: PRIMARY CARE CLINIC | Age: 76
End: 2021-04-13
Payer: MEDICARE

## 2021-04-13 VITALS
RESPIRATION RATE: 16 BRPM | WEIGHT: 161.5 LBS | OXYGEN SATURATION: 96 % | HEIGHT: 64 IN | HEART RATE: 75 BPM | TEMPERATURE: 96.4 F | BODY MASS INDEX: 27.57 KG/M2 | DIASTOLIC BLOOD PRESSURE: 70 MMHG | SYSTOLIC BLOOD PRESSURE: 138 MMHG

## 2021-04-13 DIAGNOSIS — M06.9 RHEUMATOID ARTHRITIS INVOLVING MULTIPLE SITES, UNSPECIFIED WHETHER RHEUMATOID FACTOR PRESENT (HCC): ICD-10-CM

## 2021-04-13 DIAGNOSIS — I10 ESSENTIAL HYPERTENSION: Primary | ICD-10-CM

## 2021-04-13 DIAGNOSIS — Z13.31 POSITIVE DEPRESSION SCREENING: ICD-10-CM

## 2021-04-13 DIAGNOSIS — E03.9 ACQUIRED HYPOTHYROIDISM: ICD-10-CM

## 2021-04-13 DIAGNOSIS — F32.A ANXIETY AND DEPRESSION: ICD-10-CM

## 2021-04-13 DIAGNOSIS — F41.9 ANXIETY AND DEPRESSION: ICD-10-CM

## 2021-04-13 PROCEDURE — G8417 CALC BMI ABV UP PARAM F/U: HCPCS | Performed by: FAMILY MEDICINE

## 2021-04-13 PROCEDURE — 4040F PNEUMOC VAC/ADMIN/RCVD: CPT | Performed by: FAMILY MEDICINE

## 2021-04-13 PROCEDURE — G8399 PT W/DXA RESULTS DOCUMENT: HCPCS | Performed by: FAMILY MEDICINE

## 2021-04-13 PROCEDURE — G8431 POS CLIN DEPRES SCRN F/U DOC: HCPCS | Performed by: FAMILY MEDICINE

## 2021-04-13 PROCEDURE — 1090F PRES/ABSN URINE INCON ASSESS: CPT | Performed by: FAMILY MEDICINE

## 2021-04-13 PROCEDURE — 1123F ACP DISCUSS/DSCN MKR DOCD: CPT | Performed by: FAMILY MEDICINE

## 2021-04-13 PROCEDURE — G8427 DOCREV CUR MEDS BY ELIG CLIN: HCPCS | Performed by: FAMILY MEDICINE

## 2021-04-13 PROCEDURE — 1036F TOBACCO NON-USER: CPT | Performed by: FAMILY MEDICINE

## 2021-04-13 PROCEDURE — 3017F COLORECTAL CA SCREEN DOC REV: CPT | Performed by: FAMILY MEDICINE

## 2021-04-13 PROCEDURE — 99214 OFFICE O/P EST MOD 30 MIN: CPT | Performed by: FAMILY MEDICINE

## 2021-04-13 RX ORDER — TRAZODONE HYDROCHLORIDE 100 MG/1
100 TABLET ORAL NIGHTLY
Qty: 90 TABLET | Refills: 3 | Status: SHIPPED | OUTPATIENT
Start: 2021-04-13 | End: 2022-07-25

## 2021-04-13 RX ORDER — ALBUTEROL SULFATE 90 UG/1
AEROSOL, METERED RESPIRATORY (INHALATION)
COMMUNITY
Start: 2021-04-06

## 2021-04-13 RX ORDER — DULOXETIN HYDROCHLORIDE 30 MG/1
30 CAPSULE, DELAYED RELEASE ORAL DAILY
Qty: 90 CAPSULE | Refills: 3 | Status: SHIPPED | OUTPATIENT
Start: 2021-04-13 | End: 2022-04-05 | Stop reason: SDUPTHER

## 2021-04-13 RX ORDER — LEVOTHYROXINE SODIUM 0.1 MG/1
100 TABLET ORAL DAILY
Qty: 90 TABLET | Refills: 3 | Status: SHIPPED | OUTPATIENT
Start: 2021-04-13 | End: 2021-10-13 | Stop reason: SDUPTHER

## 2021-04-13 RX ORDER — SOLIFENACIN SUCCINATE 5 MG/1
TABLET, FILM COATED ORAL
COMMUNITY
Start: 2021-02-19 | End: 2021-07-13

## 2021-04-13 ASSESSMENT — COLUMBIA-SUICIDE SEVERITY RATING SCALE - C-SSRS
6. HAVE YOU EVER DONE ANYTHING, STARTED TO DO ANYTHING, OR PREPARED TO DO ANYTHING TO END YOUR LIFE?: NO
1. WITHIN THE PAST MONTH, HAVE YOU WISHED YOU WERE DEAD OR WISHED YOU COULD GO TO SLEEP AND NOT WAKE UP?: NO

## 2021-04-13 ASSESSMENT — PATIENT HEALTH QUESTIONNAIRE - PHQ9
5. POOR APPETITE OR OVEREATING: 3
8. MOVING OR SPEAKING SO SLOWLY THAT OTHER PEOPLE COULD HAVE NOTICED. OR THE OPPOSITE, BEING SO FIGETY OR RESTLESS THAT YOU HAVE BEEN MOVING AROUND A LOT MORE THAN USUAL: 0
SUM OF ALL RESPONSES TO PHQ QUESTIONS 1-9: 18
SUM OF ALL RESPONSES TO PHQ QUESTIONS 1-9: 18
SUM OF ALL RESPONSES TO PHQ9 QUESTIONS 1 & 2: 6
2. FEELING DOWN, DEPRESSED OR HOPELESS: 3
SUM OF ALL RESPONSES TO PHQ QUESTIONS 1-9: 18
9. THOUGHTS THAT YOU WOULD BE BETTER OFF DEAD, OR OF HURTING YOURSELF: 0
1. LITTLE INTEREST OR PLEASURE IN DOING THINGS: 3

## 2021-04-22 ASSESSMENT — ENCOUNTER SYMPTOMS
ABDOMINAL PAIN: 0
WHEEZING: 0
VOMITING: 0
DIARRHEA: 0
COUGH: 0
EYE DISCHARGE: 0
COLOR CHANGE: 0
BACK PAIN: 1
NAUSEA: 0

## 2021-04-22 NOTE — PROGRESS NOTES
SUBJECTIVE:    Patient ID: Edwina Maloney is a 70 y.o. female. HPI:   Patient is seen today for 6-month follow-up. She states that overall she is doing well. She does have a history of hypothyroidism and is currently on Synthroid 100 mcg. She states that she is tolerating this dose well and has not had any change in this dose recently. She does have a history of hypertension and is currently on metoprolol. She states that she is tolerating this well. She states that her blood pressure is staying well controlled. She denies any chest pain or palpitations. She does have a history of anxiety and depression. She states that she has felt a little down lately but she states that overall she feels like her depression is stable. She denies any suicidal thoughts or ideation. She would like to continue her current dose of Cymbalta. She does take this for aches and pains as well and it works well for her.     Past Medical History:   Diagnosis Date    Abdominal pain, unspecified site     Antral gastritis     Anxiety and depression     Arthritis     Back pain     Chest pressure 05/2017    COVID-19     Decrease in appetite     Ear infection     GERD (gastroesophageal reflux disease)     Heart murmur     History of colon polyps     Mastodynia     rt axilla into breast    Neck pain     Otalgia, unspecified     Rheumatoid arthritis(714.0)     Sigmoid diverticulosis     Unspecified otitis media     Weight loss     30 lbs in 4-5 months      Current Outpatient Medications   Medication Sig Dispense Refill    albuterol sulfate  (90 Base) MCG/ACT inhaler INHALE 2 PUFFS BY MOUTH EVERY 4 HOURS AS NEEDED      solifenacin (VESICARE) 5 MG tablet TAKE 1 TABLET BY MOUTH EVERY DAY      levothyroxine (SYNTHROID) 100 MCG tablet Take 1 tablet by mouth Daily 90 tablet 3    DULoxetine (CYMBALTA) 30 MG extended release capsule Take 1 capsule by mouth daily 90 capsule 3    traZODone (DESYREL) 100 MG tablet Take 1 tablet by mouth nightly 90 tablet 3    metoprolol succinate (TOPROL XL) 50 MG extended release tablet TAKE 1 TABLET BY MOUTH DAILY 90 tablet 3    allopurinol (ZYLOPRIM) 100 MG tablet Take 1 tablet by mouth daily 90 tablet 3    linaclotide (LINZESS) 145 MCG capsule Take 290 mcg by mouth every morning (before breakfast)       lidocaine (LIDODERM) 5 % Place 1 patch onto the skin daily 12 hours on, 12 hours off. 90 patch 3    colchicine (COLCRYS) 0.6 MG tablet TAKE 2 TABLETS BY MOUTH NOW, THEN 1 TABLET 1 HOUR LATER (Patient taking differently: as needed ) 3 tablet 2    omeprazole (PRILOSEC) 40 MG delayed release capsule TAKE 1 CAPSULE BY MOUTH DAILY 90 capsule 5     No current facility-administered medications for this visit. Allergies   Allergen Reactions    Fish-Derived Products Swelling     Smell of cooking fish causes hives and throat closing off    Pcn [Penicillins] Hives       Review of Systems   Constitutional: Negative for activity change, appetite change and fever. HENT: Negative for congestion and nosebleeds. Eyes: Negative for discharge. Respiratory: Negative for cough and wheezing. Cardiovascular: Negative for chest pain and leg swelling. Gastrointestinal: Negative for abdominal pain, diarrhea, nausea and vomiting. Genitourinary: Negative for difficulty urinating, frequency and urgency. Musculoskeletal: Positive for arthralgias and back pain. Negative for gait problem. Skin: Negative for color change and rash. Neurological: Negative for dizziness and headaches. Hematological: Does not bruise/bleed easily. Psychiatric/Behavioral: Positive for dysphoric mood and sleep disturbance. Negative for suicidal ideas. The patient is nervous/anxious. OBJECTIVE:     Physical Exam  Vitals signs reviewed. Constitutional:       General: She is not in acute distress. Appearance: Normal appearance. She is well-developed. She is not diaphoretic.    HENT:      Head: Normocephalic and atraumatic. Right Ear: External ear normal.      Left Ear: External ear normal.   Neck:      Musculoskeletal: Normal range of motion and neck supple. Cardiovascular:      Rate and Rhythm: Normal rate and regular rhythm. Pulses: Normal pulses. Heart sounds: Normal heart sounds. No murmur. Pulmonary:      Effort: Pulmonary effort is normal. No respiratory distress. Breath sounds: Normal breath sounds. Abdominal:      General: Abdomen is flat. Bowel sounds are normal.      Palpations: Abdomen is soft. Tenderness: There is no abdominal tenderness. Skin:     General: Skin is warm and dry. Neurological:      General: No focal deficit present. Mental Status: She is alert and oriented to person, place, and time. Mental status is at baseline. Psychiatric:         Mood and Affect: Mood normal.         Behavior: Behavior normal.         Thought Content: Thought content normal.         Judgment: Judgment normal.        /70 (Site: Left Upper Arm, Position: Sitting, Cuff Size: Large Adult)   Pulse 75   Temp 96.4 °F (35.8 °C) (Temporal)   Resp 16   Ht 5' 3.5\" (1.613 m)   Wt 161 lb 8 oz (73.3 kg)   SpO2 96%   BMI 28.16 kg/m²      ASSESSMENT:    To Carroll was seen today for 3 month follow-up. Diagnoses and all orders for this visit:    Essential hypertension    Positive depression screening  -     Positive Screen for Clinical Depression with a Documented Follow-up Plan     Rheumatoid arthritis involving multiple sites, unspecified whether rheumatoid factor present ([facility]    Acquired hypothyroidism    Anxiety and depression    Other orders  -     levothyroxine (SYNTHROID) 100 MCG tablet; Take 1 tablet by mouth Daily  -     DULoxetine (CYMBALTA) 30 MG extended release capsule; Take 1 capsule by mouth daily  -     traZODone (DESYREL) 100 MG tablet; Take 1 tablet by mouth nightly        PLAN:    Continue current medications.   I have refilled her medications today.  Follow-up with us in 6 months for checkup unless needed sooner. We are not going to adjust her depression medications today as the Cymbalta seems to be working well for her. EMR Dragon/transcription disclaimer:  Much of this encounter note is electronic transcription/translation of spoken language toprinted texts. The electronic translation of spoken language may be erroneous, or at times, nonsensical words or phrases may be inadvertently transcribed.   Although I have reviewed the note for such errors, some may stillexist.

## 2021-04-22 NOTE — PROGRESS NOTES
On the basis of positive PHQ-9 screening (PHQ-9 Total Score: 18), the following plan was implemented: patient declines further evaluation/treatment for depression. Patient will follow-up in 6 month(s) with PCP.

## 2021-06-24 RX ORDER — OMEPRAZOLE 40 MG/1
40 CAPSULE, DELAYED RELEASE ORAL DAILY
Qty: 90 CAPSULE | Refills: 5 | Status: SHIPPED | OUTPATIENT
Start: 2021-06-24

## 2021-07-13 ENCOUNTER — OFFICE VISIT (OUTPATIENT)
Dept: PRIMARY CARE CLINIC | Age: 76
End: 2021-07-13
Payer: MEDICARE

## 2021-07-13 VITALS
RESPIRATION RATE: 16 BRPM | WEIGHT: 167 LBS | TEMPERATURE: 97.2 F | SYSTOLIC BLOOD PRESSURE: 138 MMHG | DIASTOLIC BLOOD PRESSURE: 70 MMHG | BODY MASS INDEX: 28.51 KG/M2 | HEART RATE: 60 BPM | HEIGHT: 64 IN | OXYGEN SATURATION: 98 %

## 2021-07-13 DIAGNOSIS — R10.9 ABDOMINAL PRESSURE: Primary | ICD-10-CM

## 2021-07-13 DIAGNOSIS — R10.30 LOWER ABDOMINAL PAIN: ICD-10-CM

## 2021-07-13 LAB
BILIRUBIN, POC: NORMAL
BLOOD URINE, POC: NORMAL
CLARITY, POC: CLEAR
COLOR, POC: YELLOW
GLUCOSE URINE, POC: NORMAL
KETONES, POC: NORMAL
LEUKOCYTE EST, POC: NORMAL
NITRITE, POC: NORMAL
PH, POC: 5
PROTEIN, POC: NORMAL
SPECIFIC GRAVITY, POC: 1020
UROBILINOGEN, POC: 0.2

## 2021-07-13 PROCEDURE — 3017F COLORECTAL CA SCREEN DOC REV: CPT | Performed by: FAMILY MEDICINE

## 2021-07-13 PROCEDURE — 1036F TOBACCO NON-USER: CPT | Performed by: FAMILY MEDICINE

## 2021-07-13 PROCEDURE — 99214 OFFICE O/P EST MOD 30 MIN: CPT | Performed by: FAMILY MEDICINE

## 2021-07-13 PROCEDURE — G8417 CALC BMI ABV UP PARAM F/U: HCPCS | Performed by: FAMILY MEDICINE

## 2021-07-13 PROCEDURE — 81002 URINALYSIS NONAUTO W/O SCOPE: CPT | Performed by: FAMILY MEDICINE

## 2021-07-13 PROCEDURE — G8399 PT W/DXA RESULTS DOCUMENT: HCPCS | Performed by: FAMILY MEDICINE

## 2021-07-13 PROCEDURE — 4040F PNEUMOC VAC/ADMIN/RCVD: CPT | Performed by: FAMILY MEDICINE

## 2021-07-13 PROCEDURE — 1090F PRES/ABSN URINE INCON ASSESS: CPT | Performed by: FAMILY MEDICINE

## 2021-07-13 PROCEDURE — 1123F ACP DISCUSS/DSCN MKR DOCD: CPT | Performed by: FAMILY MEDICINE

## 2021-07-13 PROCEDURE — G8427 DOCREV CUR MEDS BY ELIG CLIN: HCPCS | Performed by: FAMILY MEDICINE

## 2021-07-13 RX ORDER — ZINC GLUCONATE 50 MG
50 TABLET ORAL DAILY
COMMUNITY
End: 2021-11-16

## 2021-07-13 RX ORDER — OXYBUTYNIN CHLORIDE 10 MG/1
10 TABLET, EXTENDED RELEASE ORAL DAILY
Qty: 30 TABLET | Refills: 3 | Status: SHIPPED | OUTPATIENT
Start: 2021-07-13 | End: 2021-10-13 | Stop reason: SDUPTHER

## 2021-07-13 RX ORDER — CALCIUM CARBONATE 500(1250)
500 TABLET ORAL DAILY
COMMUNITY

## 2021-07-13 RX ORDER — FLUTICASONE PROPIONATE 50 MCG
SPRAY, SUSPENSION (ML) NASAL
COMMUNITY
Start: 2021-04-12 | End: 2021-07-13

## 2021-07-13 RX ORDER — CHLORAL HYDRATE 500 MG
3000 CAPSULE ORAL DAILY
COMMUNITY

## 2021-07-13 ASSESSMENT — ENCOUNTER SYMPTOMS
WHEEZING: 0
ABDOMINAL PAIN: 1
COUGH: 0
NAUSEA: 0
COLOR CHANGE: 0
BACK PAIN: 0
VOMITING: 0
DIARRHEA: 0
EYE DISCHARGE: 0

## 2021-07-13 NOTE — PROGRESS NOTES
SUBJECTIVE:    Patient ID: Lou Umana is a 78 y.o. female. HPI:   Patient is seen today for a 3-month follow-up. She states that overall she is doing well. She states that she is struggling with her gait. Her knees are bothering her. She states that that does affect her gait and she does not walk as well because of that. She has not had any injury to her knee. She denies any redness or swelling of her knees. She states that she is taking her Vesicare but she states that she would like to switch it back to oxybutynin as the Vesicare is $200. She states that the oxybutynin worked okay for her and she would like to start back on that because of the price. She states she also is having diarrhea with the Linzess and feels like the dose is too high. She is wanting to know if the dose can be adjusted on this. It does help with her constipation but she feels like the 10 or 90 mg is too much. She states that she is still struggling with insomnia. She takes trazodone to help with this. She states that this works well for her. She also is on Toprol-XL for her blood pressure. She states that she is tolerating this well. She does monitor her blood pressure at home occasionally and states that it stays well controlled. She does have a history of chronic kidney disease and is followed by nephrology.     Past Medical History:   Diagnosis Date    Abdominal pain, unspecified site     Antral gastritis     Anxiety and depression     Arthritis     Back pain     Chest pressure 05/2017    COVID-19     Decrease in appetite     Ear infection     GERD (gastroesophageal reflux disease)     Heart murmur     History of colon polyps     Mastodynia     rt axilla into breast    Neck pain     Otalgia, unspecified     Rheumatoid arthritis(714.0)     Sigmoid diverticulosis     Unspecified otitis media     Weight loss     30 lbs in 4-5 months      Current Outpatient Medications on File Prior to Visit Medication Sig Dispense Refill    Omega-3 Fatty Acids (FISH OIL) 1000 MG CAPS Take 3,000 mg by mouth daily      calcium carbonate (OSCAL) 500 MG TABS tablet Take 500 mg by mouth daily      zinc gluconate 50 MG tablet Take 50 mg by mouth daily      omeprazole (PRILOSEC) 40 MG delayed release capsule TAKE 1 CAPSULE BY MOUTH DAILY 90 capsule 5    albuterol sulfate  (90 Base) MCG/ACT inhaler INHALE 2 PUFFS BY MOUTH EVERY 4 HOURS AS NEEDED      levothyroxine (SYNTHROID) 100 MCG tablet Take 1 tablet by mouth Daily 90 tablet 3    DULoxetine (CYMBALTA) 30 MG extended release capsule Take 1 capsule by mouth daily 90 capsule 3    traZODone (DESYREL) 100 MG tablet Take 1 tablet by mouth nightly 90 tablet 3    metoprolol succinate (TOPROL XL) 50 MG extended release tablet TAKE 1 TABLET BY MOUTH DAILY 90 tablet 3    allopurinol (ZYLOPRIM) 100 MG tablet Take 1 tablet by mouth daily 90 tablet 3    lidocaine (LIDODERM) 5 % Place 1 patch onto the skin daily 12 hours on, 12 hours off. 90 patch 3    colchicine (COLCRYS) 0.6 MG tablet TAKE 2 TABLETS BY MOUTH NOW, THEN 1 TABLET 1 HOUR LATER (Patient taking differently: as needed ) 3 tablet 2     No current facility-administered medications on file prior to visit. Allergies   Allergen Reactions    Fish-Derived Products Swelling     Smell of cooking fish causes hives and throat closing off    Pcn [Penicillins] Hives       Review of Systems   Constitutional: Negative for activity change, appetite change and fever. HENT: Negative for congestion and nosebleeds. Eyes: Negative for discharge. Respiratory: Negative for cough and wheezing. Cardiovascular: Negative for chest pain and leg swelling. Gastrointestinal: Positive for abdominal pain (lower abdominal pain). Negative for diarrhea, nausea and vomiting. Genitourinary: Negative for difficulty urinating, frequency and urgency. Musculoskeletal: Positive for arthralgias and myalgias.  Negative for back pain and gait problem. Skin: Negative for color change and rash. Neurological: Negative for dizziness and headaches. Hematological: Does not bruise/bleed easily. Psychiatric/Behavioral: Negative for sleep disturbance and suicidal ideas. OBJECTIVE:    Physical Exam  Vitals reviewed. Constitutional:       General: She is not in acute distress. Appearance: Normal appearance. She is well-developed. She is not diaphoretic. HENT:      Head: Normocephalic and atraumatic. Right Ear: External ear normal.      Left Ear: External ear normal.   Cardiovascular:      Rate and Rhythm: Normal rate and regular rhythm. Pulses: Normal pulses. Heart sounds: Normal heart sounds. No murmur heard. Pulmonary:      Effort: Pulmonary effort is normal. No respiratory distress. Breath sounds: Normal breath sounds. Abdominal:      General: Abdomen is flat. Bowel sounds are normal.      Palpations: Abdomen is soft. Tenderness: There is abdominal tenderness. Musculoskeletal:      Cervical back: Normal range of motion and neck supple. Skin:     General: Skin is warm and dry. Neurological:      General: No focal deficit present. Mental Status: She is alert and oriented to person, place, and time. Mental status is at baseline. Psychiatric:         Mood and Affect: Mood normal.         Behavior: Behavior normal.         Thought Content: Thought content normal.         Judgment: Judgment normal.        /70 (Site: Left Upper Arm, Position: Sitting, Cuff Size: Large Adult)   Pulse 60   Temp 97.2 °F (36.2 °C) (Temporal)   Resp 16   Ht 5' 3.5\" (1.613 m)   Wt 167 lb (75.8 kg)   SpO2 98%   BMI 29.12 kg/m²      ASSESSMENT/PLAN:    Sabrina Dan was seen today for 3 month follow-up.     Diagnoses and all orders for this visit:    Abdominal pressure  -     POCT urinalysis dipstick  -     Culture, Urine    Lower abdominal pain  -     POCT urinalysis dipstick  -     Culture, Urine    Other orders  -     oxybutynin (DITROPAN XL) 10 MG extended release tablet; Take 1 tablet by mouth daily  -     linaCLOtide (LINZESS) 72 MCG CAPS capsule; Take 1 capsule by mouth every morning (before breakfast)        Problem List     Abdominal pain    Relevant Orders    POCT urinalysis dipstick (Completed)    Culture, Urine            Urinalysis was performed today in office. It does not show any significant infection however we are going to go ahead and send it for culture. We are going to DC her Vesicare and start her back on oxybutynin to see if this would be cheaper for her as well to see how it controls her symptoms for her overactive bladder. We are going to decrease her dose of Linzess from 290 mcg down to 72 mcg to see if this will help with her constipation and also decrease the diarrhea side effect that she is having with the medication. Continue other current medications follow-up with us in 5 months for a MAW unless needed sooner. EMR Dragon/transcription disclaimer:  Much of this encounter note is electronic transcription/translation of spoken language toprinted texts. The electronic translation of spoken language may be erroneous, or at times, nonsensical words or phrases may be inadvertently transcribed.   Although I have reviewed the note for such errors, some may stillexist.

## 2021-07-15 LAB — URINE CULTURE, ROUTINE: NORMAL

## 2021-10-13 RX ORDER — LEVOTHYROXINE SODIUM 0.1 MG/1
100 TABLET ORAL DAILY
Qty: 90 TABLET | Refills: 3 | Status: SHIPPED | OUTPATIENT
Start: 2021-10-13 | End: 2022-04-05

## 2021-10-13 RX ORDER — OXYBUTYNIN CHLORIDE 10 MG/1
10 TABLET, EXTENDED RELEASE ORAL DAILY
Qty: 90 TABLET | Refills: 3 | Status: SHIPPED | OUTPATIENT
Start: 2021-10-13

## 2021-10-13 RX ORDER — ALLOPURINOL 100 MG/1
100 TABLET ORAL DAILY
Qty: 90 TABLET | Refills: 3 | Status: SHIPPED | OUTPATIENT
Start: 2021-10-13

## 2021-10-28 ENCOUNTER — TELEPHONE (OUTPATIENT)
Dept: PRIMARY CARE CLINIC | Age: 76
End: 2021-10-28

## 2021-10-28 NOTE — TELEPHONE ENCOUNTER
Pt stopped taking linzess due to cost, her insurance wouldn't cover it. Is there anything else she can try for about a month she does have a coloscopy scheduled.  She had a lot of constipation and hard stools

## 2021-11-16 ENCOUNTER — OFFICE VISIT (OUTPATIENT)
Dept: PRIMARY CARE CLINIC | Age: 76
End: 2021-11-16
Payer: MEDICARE

## 2021-11-16 VITALS
OXYGEN SATURATION: 97 % | HEIGHT: 64 IN | SYSTOLIC BLOOD PRESSURE: 128 MMHG | TEMPERATURE: 97.2 F | RESPIRATION RATE: 16 BRPM | HEART RATE: 73 BPM | BODY MASS INDEX: 29.71 KG/M2 | WEIGHT: 174 LBS | DIASTOLIC BLOOD PRESSURE: 76 MMHG

## 2021-11-16 DIAGNOSIS — M06.9 RHEUMATOID ARTHRITIS INVOLVING MULTIPLE SITES, UNSPECIFIED WHETHER RHEUMATOID FACTOR PRESENT (HCC): ICD-10-CM

## 2021-11-16 DIAGNOSIS — Z00.00 ROUTINE GENERAL MEDICAL EXAMINATION AT A HEALTH CARE FACILITY: ICD-10-CM

## 2021-11-16 DIAGNOSIS — I10 ESSENTIAL HYPERTENSION: ICD-10-CM

## 2021-11-16 DIAGNOSIS — F32.A ANXIETY AND DEPRESSION: ICD-10-CM

## 2021-11-16 DIAGNOSIS — E03.9 ACQUIRED HYPOTHYROIDISM: Primary | ICD-10-CM

## 2021-11-16 DIAGNOSIS — N18.30 STAGE 3 CHRONIC KIDNEY DISEASE, UNSPECIFIED WHETHER STAGE 3A OR 3B CKD (HCC): ICD-10-CM

## 2021-11-16 DIAGNOSIS — F41.9 ANXIETY AND DEPRESSION: ICD-10-CM

## 2021-11-16 PROCEDURE — 99214 OFFICE O/P EST MOD 30 MIN: CPT | Performed by: FAMILY MEDICINE

## 2021-11-16 PROCEDURE — 1123F ACP DISCUSS/DSCN MKR DOCD: CPT | Performed by: FAMILY MEDICINE

## 2021-11-16 PROCEDURE — G8399 PT W/DXA RESULTS DOCUMENT: HCPCS | Performed by: FAMILY MEDICINE

## 2021-11-16 PROCEDURE — G8427 DOCREV CUR MEDS BY ELIG CLIN: HCPCS | Performed by: FAMILY MEDICINE

## 2021-11-16 PROCEDURE — 1090F PRES/ABSN URINE INCON ASSESS: CPT | Performed by: FAMILY MEDICINE

## 2021-11-16 PROCEDURE — G8484 FLU IMMUNIZE NO ADMIN: HCPCS | Performed by: FAMILY MEDICINE

## 2021-11-16 PROCEDURE — 4040F PNEUMOC VAC/ADMIN/RCVD: CPT | Performed by: FAMILY MEDICINE

## 2021-11-16 PROCEDURE — 3017F COLORECTAL CA SCREEN DOC REV: CPT | Performed by: FAMILY MEDICINE

## 2021-11-16 PROCEDURE — G8417 CALC BMI ABV UP PARAM F/U: HCPCS | Performed by: FAMILY MEDICINE

## 2021-11-16 PROCEDURE — G0439 PPPS, SUBSEQ VISIT: HCPCS | Performed by: FAMILY MEDICINE

## 2021-11-16 PROCEDURE — 1036F TOBACCO NON-USER: CPT | Performed by: FAMILY MEDICINE

## 2021-11-16 RX ORDER — LATANOPROST 50 UG/ML
1 SOLUTION/ DROPS OPHTHALMIC NIGHTLY
COMMUNITY

## 2021-11-16 ASSESSMENT — ENCOUNTER SYMPTOMS
VOMITING: 0
NAUSEA: 0
ABDOMINAL PAIN: 0
EYE DISCHARGE: 0
WHEEZING: 0
DIARRHEA: 0
COUGH: 0
BACK PAIN: 0
COLOR CHANGE: 0

## 2021-11-16 ASSESSMENT — PATIENT HEALTH QUESTIONNAIRE - PHQ9
SUM OF ALL RESPONSES TO PHQ9 QUESTIONS 1 & 2: 2
2. FEELING DOWN, DEPRESSED OR HOPELESS: 1
1. LITTLE INTEREST OR PLEASURE IN DOING THINGS: 1
SUM OF ALL RESPONSES TO PHQ QUESTIONS 1-9: 2

## 2021-11-16 ASSESSMENT — LIFESTYLE VARIABLES: HOW OFTEN DO YOU HAVE A DRINK CONTAINING ALCOHOL: 0

## 2021-11-16 NOTE — PATIENT INSTRUCTIONS
Personalized Preventive Plan for Aki Cotto - 11/16/2021  Medicare offers a range of preventive health benefits. Some of the tests and screenings are paid in full while other may be subject to a deductible, co-insurance, and/or copay. Some of these benefits include a comprehensive review of your medical history including lifestyle, illnesses that may run in your family, and various assessments and screenings as appropriate. After reviewing your medical record and screening and assessments performed today your provider may have ordered immunizations, labs, imaging, and/or referrals for you. A list of these orders (if applicable) as well as your Preventive Care list are included within your After Visit Summary for your review. Other Preventive Recommendations:    · A preventive eye exam performed by an eye specialist is recommended every 1-2 years to screen for glaucoma; cataracts, macular degeneration, and other eye disorders. · A preventive dental visit is recommended every 6 months. · Try to get at least 150 minutes of exercise per week or 10,000 steps per day on a pedometer . · Order or download the FREE \"Exercise & Physical Activity: Your Everyday Guide\" from [employer]. Call 9-390.673.5052 or search [employer] online. · You need 1471-2684 mg of calcium and 5197-0246 IU of vitamin D per day. It is possible to meet your calcium requirement with diet alone, but a vitamin D supplement is usually necessary to meet this goal.  · When exposed to the sun, use a sunscreen that protects against both UVA and UVB radiation with an SPF of 30 or greater. Reapply every 2 to 3 hours or after sweating, drying off with a towel, or swimming. · Always wear a seat belt when traveling in a car. Always wear a helmet when riding a bicycle or motorcycle.

## 2021-11-16 NOTE — PROGRESS NOTES
SUBJECTIVE:    Patient ID: Jorgito Giraldo is a 78 y.o. female. HPI:   Patient is seen today for Medicare annual wellness but also has several chronic problems that we follow including chronic kidney disease. She sees nephrology. She states that she has had lab work done for them recently. She sees them every 6 months to 1 year. She sees Dr. Carlos Bailey. She does have a history of rheumatoid arthritis and takes Cymbalta for pain. She states that this works well for her. She would like to continue the current dose of this. She is also on trazodone for insomnia and states that this is working well as well. She states that she also has a history of hypothyroidism. She is on Synthroid 100 mcg. She is tolerating this well and is on any change in the dosage of this recently. She states that she is tolerating it well.     Past Medical History:   Diagnosis Date    Abdominal pain, unspecified site     Antral gastritis     Anxiety and depression     Arthritis     Back pain     Chest pressure 05/2017    COVID-19     Decrease in appetite     Ear infection     GERD (gastroesophageal reflux disease)     Heart murmur     History of colon polyps     Mastodynia     rt axilla into breast    Neck pain     Otalgia, unspecified     Rheumatoid arthritis(714.0)     Sigmoid diverticulosis     Unspecified otitis media     Weight loss     30 lbs in 4-5 months      Current Outpatient Medications on File Prior to Visit   Medication Sig Dispense Refill    latanoprost (XALATAN) 0.005 % ophthalmic solution 1 drop nightly      allopurinol (ZYLOPRIM) 100 MG tablet Take 1 tablet by mouth daily 90 tablet 3    levothyroxine (SYNTHROID) 100 MCG tablet Take 1 tablet by mouth Daily 90 tablet 3    oxybutynin (DITROPAN XL) 10 MG extended release tablet Take 1 tablet by mouth daily 90 tablet 3    Omega-3 Fatty Acids (FISH OIL) 1000 MG CAPS Take 3,000 mg by mouth daily      calcium carbonate (OSCAL) 500 MG TABS tablet Take 500 mg by mouth daily      omeprazole (PRILOSEC) 40 MG delayed release capsule TAKE 1 CAPSULE BY MOUTH DAILY 90 capsule 5    albuterol sulfate  (90 Base) MCG/ACT inhaler INHALE 2 PUFFS BY MOUTH EVERY 4 HOURS AS NEEDED      DULoxetine (CYMBALTA) 30 MG extended release capsule Take 1 capsule by mouth daily 90 capsule 3    traZODone (DESYREL) 100 MG tablet Take 1 tablet by mouth nightly 90 tablet 3    metoprolol succinate (TOPROL XL) 50 MG extended release tablet TAKE 1 TABLET BY MOUTH DAILY 90 tablet 3    lidocaine (LIDODERM) 5 % Place 1 patch onto the skin daily 12 hours on, 12 hours off. 90 patch 3    colchicine (COLCRYS) 0.6 MG tablet TAKE 2 TABLETS BY MOUTH NOW, THEN 1 TABLET 1 HOUR LATER (Patient taking differently: as needed ) 3 tablet 2     No current facility-administered medications on file prior to visit. Allergies   Allergen Reactions    Fish-Derived Products Swelling     Smell of cooking fish causes hives and throat closing off    Pcn [Penicillins] Hives       Review of Systems   Constitutional: Negative for activity change, appetite change and fever. HENT: Negative for congestion and nosebleeds. Eyes: Negative for discharge. Respiratory: Negative for cough and wheezing. Cardiovascular: Negative for chest pain and leg swelling. Gastrointestinal: Negative for abdominal pain, diarrhea, nausea and vomiting. Genitourinary: Negative for difficulty urinating, frequency and urgency. Musculoskeletal: Negative for back pain and gait problem. Skin: Negative for color change and rash. Neurological: Negative for dizziness and headaches. Hematological: Does not bruise/bleed easily. Psychiatric/Behavioral: Negative for sleep disturbance and suicidal ideas. OBJECTIVE:    Physical Exam  Vitals reviewed. Constitutional:       General: She is not in acute distress. Appearance: Normal appearance. She is well-developed. She is not diaphoretic.    HENT: Head: Normocephalic and atraumatic. Right Ear: External ear normal.      Left Ear: External ear normal.   Cardiovascular:      Rate and Rhythm: Normal rate and regular rhythm. Pulses: Normal pulses. Heart sounds: Normal heart sounds. No murmur heard. Pulmonary:      Effort: Pulmonary effort is normal. No respiratory distress. Breath sounds: Normal breath sounds. Abdominal:      General: Abdomen is flat. Bowel sounds are normal.      Palpations: Abdomen is soft. Tenderness: There is no abdominal tenderness. Musculoskeletal:      Cervical back: Normal range of motion and neck supple. Skin:     General: Skin is warm and dry. Neurological:      General: No focal deficit present. Mental Status: She is alert and oriented to person, place, and time. Mental status is at baseline. Psychiatric:         Mood and Affect: Mood normal.         Behavior: Behavior normal.         Thought Content: Thought content normal.         Judgment: Judgment normal.        /76 (Site: Left Upper Arm, Position: Sitting, Cuff Size: Medium Adult)   Pulse 73   Temp 97.2 °F (36.2 °C) (Temporal)   Resp 16   Ht 5' 3.5\" (1.613 m)   Wt 174 lb (78.9 kg)   SpO2 97%   BMI 30.34 kg/m²      ASSESSMENT/PLAN:    Danna Meigs was seen today for medicare aw. Diagnoses and all orders for this visit:    Acquired hypothyroidism  -     CBC Auto Differential; Future  -     Comprehensive Metabolic Panel; Future  -     Lipid Panel; Future  -     TSH without Reflex; Future  -     T4, Free; Future    Essential hypertension  -     CBC Auto Differential; Future  -     Comprehensive Metabolic Panel; Future  -     Lipid Panel; Future  -     TSH without Reflex;  Future  -     T4, Free; Future    Routine general medical examination at a health care facility    Anxiety and depression    Stage 3 chronic kidney disease, unspecified whether stage 3a or 3b CKD (HCC)    Rheumatoid arthritis involving multiple sites, unspecified whether rheumatoid factor present ([address]        Continue current medications for hypothyroidism. Continue Synthroid 100 mcg. We are going to check levels on her and she is going to have these done fasting at [facility].  Continue metoprolol for blood pressure as this is working well for her. Continue Cymbalta for anxiety depression as well as pain related to rheumatoid arthritis. Continue trazodone for insomnia. Follow-up with us in 6 months for checkup unless needed sooner. She wants to hold off on flu shot today. EMR Dragon/transcription disclaimer:  Much of this encounter note is electronic transcription/translation of spoken language toprinted texts. The electronic translation of spoken language may be erroneous, or at times, nonsensical words or phrases may be inadvertently transcribed.   Although I have reviewed the note for such errors, some may stillexist.

## 2021-11-16 NOTE — PROGRESS NOTES
tablet TAKE 1 TABLET BY MOUTH DAILY Yes Naty Forman MD   lidocaine (LIDODERM) 5 % Place 1 patch onto the skin daily 12 hours on, 12 hours off.  Yes Viji Ho MD   colchicine (COLCRYS) 0.6 MG tablet TAKE 2 TABLETS BY MOUTH NOW, THEN 1 TABLET 1 HOUR LATER  Patient taking differently: as needed  Yes Viji Ho MD         Past Medical History:   Diagnosis Date    Abdominal pain, unspecified site     Antral gastritis     Anxiety and depression     Arthritis     Back pain     Chest pressure 05/2017    COVID-19     Decrease in appetite     Ear infection     GERD (gastroesophageal reflux disease)     Heart murmur     History of colon polyps     Mastodynia     rt axilla into breast    Neck pain     Otalgia, unspecified     Rheumatoid arthritis(714.0)     Sigmoid diverticulosis     Unspecified otitis media     Weight loss     30 lbs in 4-5 months       Past Surgical History:   Procedure Laterality Date    CHOLECYSTECTOMY  9-8-11    Dr Ole Galeas    COLONOSCOPY  9-12-12    Woody Thibodeaux    INTRACAPSULAR CATARACT EXTRACTION Left 12/28/2015    PHACO CAT EXT W/ IOL LT EYE performed by Eusebio Thibodeaux MD at [address] [facility] Right 2/15/2016    PHACO CAT EXT W/ IOL RT EYE performed by Eusebio Thibodeaux MD at [place] [id number] Left 04/08/2019    TUBAL LIGATION      UPPER GASTROINTESTINAL ENDOSCOPY  9-12-12    Woody Belle         Family History   Problem Relation Age of Onset    Stroke Father     Cancer Brother         throat    Other Mother         had colectomy and surgeries due to adhesions    Colon Cancer Paternal Uncle     Colon Polyps Paternal Uncle     Diabetes Sister        CareTeam (Including outside providers/suppliers regularly involved in providing care):   Patient Care Team:  Viji Ho MD as PCP - General (Family Medicine)  Viji Ho MD as PCP - [facility] Simmons Provider  Raúl Mabry MD as Consulting Physician (Interventional Cardiology)    Wt Readings from Last 3 Encounters:   11/16/21 174 lb (78.9 kg)   07/13/21 167 lb (75.8 kg)   04/13/21 161 lb 8 oz (73.3 kg)     Vitals:    11/16/21 1121   BP: 128/76   Site: Left Upper Arm   Position: Sitting   Cuff Size: Medium Adult   Pulse: 73   Resp: 16   Temp: 97.2 °F (36.2 °C)   TempSrc: Temporal   SpO2: 97%   Weight: 174 lb (78.9 kg)   Height: 5' 3.5\" (1.613 m)     Body mass index is 30.34 kg/m². Based upon direct observation of the patient, evaluation of cognition reveals recent and remote memory intact. Patient's complete Health Risk Assessment and screening values have been reviewed and are found in Flowsheets. The following problems were reviewed today and where indicated follow up appointments were made and/or referrals ordered. Positive Risk Factor Screenings with Interventions:            General Health and ACP:  General  In general, how would you say your health is?: Good  In the past 7 days, have you experienced any of the following?  New or Increased Pain, New or Increased Fatigue, Loneliness, Social Isolation, Stress or Anger?: (!) Loneliness  Do you get the social and emotional support that you need?: (!) No  Do you have a Living Will?: Yes  Advance Directives     Power of  Living Will ACP-Advance Directive ACP-Power of     Not on File [place] on 10/25/17 Filed Not on File      General Health Risk Interventions:  · Loneliness: patient declines any further intervention for this issue  · Social isolation: patient declines any further intervention for this issue    Health Habits/Nutrition:  Health Habits/Nutrition  Do you exercise for at least 20 minutes 2-3 times per week?: (!) No  Have you lost any weight without trying in the past 3 months?: No  Do you eat only one meal per day?: No  Have you seen the dentist within the past year?: Yes  Body mass index: (!) 30.34  Health Habits/Nutrition Td or Tdap) 11/19/2023    DEXA (modify frequency per FRAX score)  Completed    Pneumococcal 65+ years Vaccine  Completed    Hepatitis A vaccine  Aged Out    Hepatitis B vaccine  Aged Out    Hib vaccine  Aged Out    Meningococcal (ACWY) vaccine  Aged Out     Recommendations for Direct Hit Due: see orders and patient instructions/AVS.  . Recommended screening schedule for the next 5-10 years is provided to the patient in written form: see Patient Irvin Kelley was seen today for medicare aw. Diagnoses and all orders for this visit:    Acquired hypothyroidism  -     CBC Auto Differential; Future  -     Comprehensive Metabolic Panel; Future  -     Lipid Panel; Future  -     TSH without Reflex; Future  -     T4, Free; Future    Essential hypertension  -     CBC Auto Differential; Future  -     Comprehensive Metabolic Panel; Future  -     Lipid Panel; Future  -     TSH without Reflex;  Future  -     T4, Free; Future

## 2021-12-28 DIAGNOSIS — M54.9 BACK PAIN WITH RADIATION: ICD-10-CM

## 2021-12-28 RX ORDER — LIDOCAINE 50 MG/G
1 PATCH TOPICAL DAILY
Qty: 90 PATCH | Refills: 3 | Status: SHIPPED | OUTPATIENT
Start: 2021-12-28

## 2022-03-21 RX ORDER — METOPROLOL SUCCINATE 50 MG/1
50 TABLET, EXTENDED RELEASE ORAL DAILY
Qty: 90 TABLET | Refills: 3 | Status: SHIPPED | OUTPATIENT
Start: 2022-03-21

## 2022-04-05 ENCOUNTER — TELEMEDICINE (OUTPATIENT)
Dept: PRIMARY CARE CLINIC | Age: 77
End: 2022-04-05
Payer: MEDICARE

## 2022-04-05 DIAGNOSIS — I10 ESSENTIAL HYPERTENSION: ICD-10-CM

## 2022-04-05 DIAGNOSIS — E03.9 ACQUIRED HYPOTHYROIDISM: ICD-10-CM

## 2022-04-05 DIAGNOSIS — N18.30 STAGE 3 CHRONIC KIDNEY DISEASE, UNSPECIFIED WHETHER STAGE 3A OR 3B CKD (HCC): ICD-10-CM

## 2022-04-05 DIAGNOSIS — F33.0 MAJOR DEPRESSIVE DISORDER, RECURRENT, MILD (HCC): Primary | ICD-10-CM

## 2022-04-05 DIAGNOSIS — M06.9 RHEUMATOID ARTHRITIS INVOLVING MULTIPLE SITES, UNSPECIFIED WHETHER RHEUMATOID FACTOR PRESENT (HCC): ICD-10-CM

## 2022-04-05 PROCEDURE — G8427 DOCREV CUR MEDS BY ELIG CLIN: HCPCS | Performed by: FAMILY MEDICINE

## 2022-04-05 PROCEDURE — 4040F PNEUMOC VAC/ADMIN/RCVD: CPT | Performed by: FAMILY MEDICINE

## 2022-04-05 PROCEDURE — 99214 OFFICE O/P EST MOD 30 MIN: CPT | Performed by: FAMILY MEDICINE

## 2022-04-05 PROCEDURE — G8399 PT W/DXA RESULTS DOCUMENT: HCPCS | Performed by: FAMILY MEDICINE

## 2022-04-05 PROCEDURE — 1090F PRES/ABSN URINE INCON ASSESS: CPT | Performed by: FAMILY MEDICINE

## 2022-04-05 PROCEDURE — 3017F COLORECTAL CA SCREEN DOC REV: CPT | Performed by: FAMILY MEDICINE

## 2022-04-05 PROCEDURE — 1123F ACP DISCUSS/DSCN MKR DOCD: CPT | Performed by: FAMILY MEDICINE

## 2022-04-05 RX ORDER — HYDROCODONE BITARTRATE AND ACETAMINOPHEN 7.5; 325 MG/1; MG/1
1 TABLET ORAL
Qty: 60 TABLET | Refills: 0 | Status: SHIPPED | OUTPATIENT
Start: 2022-04-05 | End: 2022-05-05

## 2022-04-05 RX ORDER — DULOXETIN HYDROCHLORIDE 60 MG/1
60 CAPSULE, DELAYED RELEASE ORAL DAILY
Qty: 90 CAPSULE | Refills: 2 | Status: SHIPPED | OUTPATIENT
Start: 2022-04-05 | End: 2022-07-04

## 2022-04-05 ASSESSMENT — ENCOUNTER SYMPTOMS
ABDOMINAL PAIN: 0
COUGH: 0
WHEEZING: 0
NAUSEA: 0
DIARRHEA: 0
COLOR CHANGE: 0
BACK PAIN: 1
VOMITING: 0
EYE DISCHARGE: 0

## 2022-04-05 NOTE — PROGRESS NOTES
[address]  Phone:  [phone]      2022     TELEHEALTH EVALUATION -- Audio/Visual (During [id number] public health emergency)    HPI:    Luis Manuel Talavera (:  1945) has requested an audio/video evaluation for the following concern(s):    Patient is seen today for complaints of back pain and just overall not feeling well. She has a history of rheumatoid arthritis. She is seeing Dr. Anjelica Ty and saw him in February. She states that he currently has her on Cymbalta 30 mg help with her symptoms. She states that she feels like this helps some. She states that she is having difficulty sleeping because of the pain. She states that she would like to get a refill on her Norco which she takes sparingly but she states that she is out currently. She states that this does help whenever her pain gets so bad. She states that she does not take it very often. She is taking her Synthroid 100 mcg. She states that she feels like her thyroid levels may be off. She did have her blood work done in December from her [facility] which we reviewed in office today as we had not received a copy of until today. She states that she is taking her Synthroid regularly. Thyroid was within the normal range. She states that blood pressure is staying better controlled at home. She has been checking it at home regularly. She states that she is still seeing Lito Gabriel for chronic kidney disease. Her kidney function was stable on review of her labs today. Review of Systems   Constitutional: Negative for activity change, appetite change and fever. HENT: Negative for congestion and nosebleeds. Eyes: Negative for discharge. Respiratory: Negative for cough and wheezing. Cardiovascular: Negative for chest pain and leg swelling. Gastrointestinal: Negative for abdominal pain, diarrhea, nausea and vomiting. Genitourinary: Negative for difficulty urinating, frequency and urgency. Musculoskeletal: Positive for arthralgias, back pain and myalgias. Negative for gait problem. Skin: Negative for color change and rash. Neurological: Negative for dizziness and headaches. Hematological: Does not bruise/bleed easily. Psychiatric/Behavioral: Negative for sleep disturbance and suicidal ideas. Prior to Visit Medications    Medication Sig Taking? Authorizing Provider   DULoxetine (CYMBALTA) 60 MG extended release capsule Take 1 capsule by mouth daily Yes Duncan Moore MD   HYDROcodone-acetaminophen (NORCO) 7.5-325 MG per tablet Take 1 tablet by mouth every 4-6 hours as needed for Pain for up to 30 days. Yes Duncan Moore MD   metoprolol succinate (TOPROL XL) 50 MG extended release tablet TAKE 1 TABLET BY MOUTH DAILY Yes Naty Forman MD   lidocaine (LIDODERM) 5 % Place 1 patch onto the skin daily 12 hours on, 12 hours off.  Yes MILO Ramirez NP   latanoprost (XALATAN) 0.005 % ophthalmic solution 1 drop nightly Yes Historical Provider, MD   allopurinol (ZYLOPRIM) 100 MG tablet Take 1 tablet by mouth daily Yes Duncan Moore MD   levothyroxine (SYNTHROID) 100 MCG tablet Take 1 tablet by mouth Daily Yes Naty Forman MD   Omega-3 Fatty Acids (FISH OIL) 1000 MG CAPS Take 3,000 mg by mouth daily Yes Historical Provider, MD   calcium carbonate (OSCAL) 500 MG TABS tablet Take 500 mg by mouth daily Yes Historical Provider, MD   omeprazole (PRILOSEC) 40 MG delayed release capsule TAKE 1 CAPSULE BY MOUTH DAILY Yes Duncan Moore MD   traZODone (DESYREL) 100 MG tablet Take 1 tablet by mouth nightly Yes Naty Forman MD   oxybutynin (DITROPAN XL) 10 MG extended release tablet Take 1 tablet by mouth daily  Patient not taking: Reported on 4/5/2022  Naty Forman MD   albuterol sulfate  (90 Base) MCG/ACT inhaler INHALE 2 PUFFS BY MOUTH EVERY 4 HOURS AS NEEDED  Patient not taking: Reported on  Smokeless tobacco: Never Used   Vaping Use    Vaping Use: Never used   Substance Use Topics    Alcohol use: No    Drug use: No   ,   Family History   Problem Relation Age of Onset    Stroke Father     Cancer Brother         throat   Zannie Cowden Other Mother         had colectomy and surgeries due to adhesions    Colon Cancer Paternal Uncle     Colon Polyps Paternal Uncle     Diabetes Sister        PHYSICAL EXAMINATION:  Physical Exam  Vitals reviewed. Constitutional:       General: She is not in acute distress. Appearance: Normal appearance. She is well-developed. She is not diaphoretic. HENT:      Head: Normocephalic and atraumatic. Right Ear: External ear normal.      Left Ear: External ear normal.   Cardiovascular:      Rate and Rhythm: Normal rate and regular rhythm. Pulses: Normal pulses. Heart sounds: Normal heart sounds. No murmur heard. Pulmonary:      Effort: Pulmonary effort is normal. No respiratory distress. Breath sounds: Normal breath sounds. Musculoskeletal:      Cervical back: Normal range of motion and neck supple. Skin:     General: Skin is warm and dry. Neurological:      General: No focal deficit present. Mental Status: She is alert and oriented to person, place, and time. Mental status is at baseline. Psychiatric:         Mood and Affect: Mood normal.         Behavior: Behavior normal.         Thought Content: Thought content normal.         Judgment: Judgment normal.         Due to this being a TeleHealth encounter, evaluation of the following organ systems is limited: Vitals/Constitutional/EENT/Resp/CV/GI//MS/Neuro/Skin/Heme-Lymph-Imm. ASSESSMENT/PLAN:  1. Rheumatoid arthritis involving multiple sites, unspecified whether rheumatoid factor present (HCC)  Increase cymbalta to 60 mg daily.  - TSH; Future  - T4, Free; Future  - Sedimentation Rate; Future  - C-Reactive Protein;  Future  - CBC with Auto Differential; Future  - HYDROcodone-acetaminophen (NORCO) 7.5-325 MG per tablet; Take 1 tablet by mouth every 4-6 hours as needed for Pain for up to 30 days. Dispense: 60 tablet; Refill: 0    2. Stage 3 chronic kidney disease, unspecified whether stage 3a or 3b CKD ([facility] [address]  Kidney function is stable. - TSH; Future  - T4, Free; Future  - Sedimentation Rate; Future  - C-Reactive Protein; Future  - CBC with Auto Differential; Future    3. Major depressive disorder, recurrent, mild  Continue cymbalta but increase dose to 60 mg.  - TSH; Future  - T4, Free; Future  - Sedimentation Rate; Future  - C-Reactive Protein; Future  - CBC with Auto Differential; Future    4. Acquired hypothyroidism  Continue synthroid. - TSH; Future  - T4, Free; Future  - Sedimentation Rate; Future  - C-Reactive Protein; Future  - CBC with Auto Differential; Future    5. Essential hypertension  toprol xl continued. - TSH; Future  - T4, Free; Future  - Sedimentation Rate; Future  - C-Reactive Protein; Future  - CBC with Auto Differential; Future      Return if symptoms worsen or fail to improve. An  electronic signature was used to authenticate this note. --Gena Cano MD on 4/5/2022 at 5:22 PM      Pursuant to the emergency declaration under the [address] [place] [address], [address] [place] [address] authority and the [employer], this Virtual  Visit was conducted, with patient's consent, to reduce the patient's risk of exposure to COVID-19 and provide continuity of care for an established patient. Services were provided through a video synchronous discussion virtually to substitute for in-person clinic visit.

## 2022-07-25 RX ORDER — TRAZODONE HYDROCHLORIDE 100 MG/1
TABLET ORAL
Qty: 90 TABLET | Refills: 3 | Status: SHIPPED | OUTPATIENT
Start: 2022-07-25

## 2022-11-07 RX ORDER — ALLOPURINOL 100 MG/1
100 TABLET ORAL DAILY
Qty: 90 TABLET | Refills: 3 | Status: SHIPPED | OUTPATIENT
Start: 2022-11-07

## 2022-11-30 ENCOUNTER — TELEMEDICINE (OUTPATIENT)
Dept: PRIMARY CARE CLINIC | Age: 77
End: 2022-11-30
Payer: MEDICARE

## 2022-11-30 DIAGNOSIS — Z00.00 MEDICARE ANNUAL WELLNESS VISIT, SUBSEQUENT: Primary | ICD-10-CM

## 2022-11-30 PROBLEM — N18.9 ANEMIA DUE TO CHRONIC KIDNEY DISEASE: Status: ACTIVE | Noted: 2021-11-02

## 2022-11-30 PROBLEM — M54.12 CERVICAL RADICULOPATHY: Status: ACTIVE | Noted: 2022-11-30

## 2022-11-30 PROBLEM — H91.90 HEARING LOSS: Status: ACTIVE | Noted: 2017-09-21

## 2022-11-30 PROBLEM — H93.19 SUBJECTIVE TINNITUS: Status: ACTIVE | Noted: 2017-09-21

## 2022-11-30 PROBLEM — N32.81 OVERACTIVE BLADDER: Status: ACTIVE | Noted: 2019-03-28

## 2022-11-30 PROBLEM — M54.16 RADICULOPATHY, LUMBAR REGION: Status: ACTIVE | Noted: 2022-11-30

## 2022-11-30 PROBLEM — M17.12 OSTEOARTHRITIS OF LEFT KNEE: Status: ACTIVE | Noted: 2019-03-28

## 2022-11-30 PROBLEM — H72.00 CENTRAL PERFORATION OF TYMPANIC MEMBRANE: Status: ACTIVE | Noted: 2022-11-30

## 2022-11-30 PROBLEM — M54.16 LUMBAR RADICULOPATHY: Status: ACTIVE | Noted: 2022-11-30

## 2022-11-30 PROBLEM — M62.81 MUSCLE WEAKNESS: Status: ACTIVE | Noted: 2018-04-14

## 2022-11-30 PROBLEM — H70.10 CHRONIC MASTOIDITIS: Status: ACTIVE | Noted: 2017-10-19

## 2022-11-30 PROBLEM — K22.70 BARRETT'S ESOPHAGUS: Status: ACTIVE | Noted: 2019-03-28

## 2022-11-30 PROBLEM — R55 NEAR SYNCOPE: Status: ACTIVE | Noted: 2018-04-14

## 2022-11-30 PROBLEM — E89.0 HISTORY OF THYROIDECTOMY: Status: ACTIVE | Noted: 2018-04-14

## 2022-11-30 PROBLEM — D61.818 PANCYTOPENIA (HCC): Status: ACTIVE | Noted: 2021-11-01

## 2022-11-30 PROBLEM — U07.1 COVID-19: Status: ACTIVE | Noted: 2021-03-01

## 2022-11-30 PROBLEM — R42 VERTIGO: Status: ACTIVE | Noted: 2017-07-19

## 2022-11-30 PROBLEM — D63.1 ANEMIA DUE TO CHRONIC KIDNEY DISEASE: Status: ACTIVE | Noted: 2021-11-02

## 2022-11-30 PROCEDURE — G8484 FLU IMMUNIZE NO ADMIN: HCPCS | Performed by: FAMILY MEDICINE

## 2022-11-30 PROCEDURE — 1123F ACP DISCUSS/DSCN MKR DOCD: CPT | Performed by: FAMILY MEDICINE

## 2022-11-30 PROCEDURE — 3017F COLORECTAL CA SCREEN DOC REV: CPT | Performed by: FAMILY MEDICINE

## 2022-11-30 PROCEDURE — G0439 PPPS, SUBSEQ VISIT: HCPCS | Performed by: FAMILY MEDICINE

## 2022-11-30 RX ORDER — CYANOCOBALAMIN 1000 UG/ML
INJECTION, SOLUTION INTRAMUSCULAR; SUBCUTANEOUS
COMMUNITY

## 2022-11-30 RX ORDER — MULTIVIT WITH MINERALS/LUTEIN
2000 TABLET ORAL DAILY
COMMUNITY

## 2022-11-30 SDOH — ECONOMIC STABILITY: FOOD INSECURITY: WITHIN THE PAST 12 MONTHS, THE FOOD YOU BOUGHT JUST DIDN'T LAST AND YOU DIDN'T HAVE MONEY TO GET MORE.: NEVER TRUE

## 2022-11-30 SDOH — ECONOMIC STABILITY: FOOD INSECURITY: WITHIN THE PAST 12 MONTHS, YOU WORRIED THAT YOUR FOOD WOULD RUN OUT BEFORE YOU GOT MONEY TO BUY MORE.: NEVER TRUE

## 2022-11-30 ASSESSMENT — PATIENT HEALTH QUESTIONNAIRE - PHQ9
3. TROUBLE FALLING OR STAYING ASLEEP: 0
SUM OF ALL RESPONSES TO PHQ QUESTIONS 1-9: 0
2. FEELING DOWN, DEPRESSED OR HOPELESS: 0
6. FEELING BAD ABOUT YOURSELF - OR THAT YOU ARE A FAILURE OR HAVE LET YOURSELF OR YOUR FAMILY DOWN: 0
10. IF YOU CHECKED OFF ANY PROBLEMS, HOW DIFFICULT HAVE THESE PROBLEMS MADE IT FOR YOU TO DO YOUR WORK, TAKE CARE OF THINGS AT HOME, OR GET ALONG WITH OTHER PEOPLE: 0
SUM OF ALL RESPONSES TO PHQ QUESTIONS 1-9: 0
5. POOR APPETITE OR OVEREATING: 0
SUM OF ALL RESPONSES TO PHQ QUESTIONS 1-9: 0
SUM OF ALL RESPONSES TO PHQ9 QUESTIONS 1 & 2: 0
1. LITTLE INTEREST OR PLEASURE IN DOING THINGS: 0
8. MOVING OR SPEAKING SO SLOWLY THAT OTHER PEOPLE COULD HAVE NOTICED. OR THE OPPOSITE, BEING SO FIGETY OR RESTLESS THAT YOU HAVE BEEN MOVING AROUND A LOT MORE THAN USUAL: 0
4. FEELING TIRED OR HAVING LITTLE ENERGY: 0
7. TROUBLE CONCENTRATING ON THINGS, SUCH AS READING THE NEWSPAPER OR WATCHING TELEVISION: 0
SUM OF ALL RESPONSES TO PHQ QUESTIONS 1-9: 0
9. THOUGHTS THAT YOU WOULD BE BETTER OFF DEAD, OR OF HURTING YOURSELF: 0

## 2022-11-30 ASSESSMENT — LIFESTYLE VARIABLES
HOW MANY STANDARD DRINKS CONTAINING ALCOHOL DO YOU HAVE ON A TYPICAL DAY: PATIENT DOES NOT DRINK
HOW OFTEN DO YOU HAVE A DRINK CONTAINING ALCOHOL: NEVER

## 2022-11-30 ASSESSMENT — SOCIAL DETERMINANTS OF HEALTH (SDOH): HOW HARD IS IT FOR YOU TO PAY FOR THE VERY BASICS LIKE FOOD, HOUSING, MEDICAL CARE, AND HEATING?: NOT HARD AT ALL

## 2022-11-30 NOTE — PROGRESS NOTES
Medicare Annual Wellness Visit    Lashell Chau is called by phone in her home for Medicare AWV    Assessment & Plan   Medicare annual wellness visit, subsequent    Recommendations for Preventive Services Due: see orders and patient instructions/AVS.  Recommended screening schedule for the next 5-10 years is provided to the patient in written form: see Patient Instructions/AVS.     No follow-ups on file. Subjective   Benita Tobias is doing good. 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 She does stay as active as she can. Her  is a big help to her. Patient's complete Health Risk Assessment and screening values have been reviewed and are found in Flowsheets. The following problems were reviewed today and where indicated follow up appointments were made and/or referrals ordered.     Positive Risk Factor Screenings with Interventions:    Fall Risk:  Do you feel unsteady or are you worried about falling? : (!) yes  2 or more falls in past year?: (!) yes  Fall with injury in past year?: no   Fall Risk Interventions:    Home safety tips provided  Home exercises provided to promote strength and balance  Patient declines any further evaluation/treatment for this issue             Health Habits/Nutrition:  Physical Activity: Sufficiently Active    Days of Exercise per Week: 7 days    Minutes of Exercise per Session: 30 min     Have you lost any weight without trying in the past 3 months?: No     Have you seen the dentist within the past year?: Yes  Health Habits/Nutrition Interventions:  Inadequate physical activity:  educational materials provided to promote increased physical activity, patient is not ready to increase his/her physical activity level at this time    Hearing/Vision:  Do you or your family notice any trouble with your hearing that hasn't been managed with hearing aids?: (!) Yes  Do you have difficulty driving, watching TV, or doing any of your daily activities because of your eyesight?: No  Have you had an eye exam within the past year?: Yes  No results found. Hearing/Vision Interventions:  Hearing concerns:  patient declines any further evaluation/treatment for hearing issues            Objective      Patient-Reported Vitals  Patient-Reported Weight: 174 lbs  Patient-Reported Height: 5' 3.5\"     Recent memory is intact. Remote memory is not tested at this time due to the VV per phone. Words were recalled without diff. Allergies   Allergen Reactions    Fish-Derived Products Swelling     Smell of cooking fish causes hives and throat closing off    Pcn [Penicillins] Hives     Prior to Visit Medications    Medication Sig Taking? Authorizing Provider   vitamin E 1000 units capsule Take 2,000 Units by mouth daily Yes Historical Provider, MD   allopurinol (ZYLOPRIM) 100 MG tablet TAKE 1 TABLET BY MOUTH DAILY Yes Maura Justice MD   traZODone (DESYREL) 100 MG tablet TAKE 1 TABLET BY MOUTH EVERY NIGHT Yes Naty Forman MD   DULoxetine (CYMBALTA) 60 MG extended release capsule Take 1 capsule by mouth daily Yes Maura Justice MD   metoprolol succinate (TOPROL XL) 50 MG extended release tablet TAKE 1 TABLET BY MOUTH DAILY Yes Naty Forman MD   lidocaine (LIDODERM) 5 % Place 1 patch onto the skin daily 12 hours on, 12 hours off.  Yes MILO Bethea NP   latanoprost (XALATAN) 0.005 % ophthalmic solution 1 drop nightly Yes Historical Provider, MD   levothyroxine (SYNTHROID) 100 MCG tablet Take 1 tablet by mouth Daily Yes Naty Forman MD   Omega-3 Fatty Acids (FISH OIL) 1000 MG CAPS Take 3,000 mg by mouth daily Yes Historical Provider, MD   calcium carbonate (OSCAL) 500 MG TABS tablet Take 500 mg by mouth daily Yes Historical Provider, MD   omeprazole (PRILOSEC) 40 MG delayed release capsule TAKE 1 CAPSULE BY MOUTH DAILY Yes Maura Justice MD   albuterol sulfate  (90 Base) MCG/ACT inhaler INHALE 2 PUFFS BY MOUTH EVERY 4 HOURS AS NEEDED Yes Historical Provider, MD   colchicine (COLCRYS) 0.6 MG tablet TAKE 2 TABLETS BY MOUTH NOW, THEN 1 TABLET 1 HOUR LATER  Patient taking differently: as needed Yes Getachew Brannon MD   cyanocobalamin 1000 MCG/ML injection cyanocobalamin (vit B-12) 1,000 mcg/mL injection solution  Historical Provider, MD       [facility] (Including outside providers/suppliers regularly involved in providing care):   Patient Care Team:  Getachew Brannon MD as PCP - General (Family Medicine)  Getachew Brannon MD as PCP - [address] [place] [address] Dr [facility]San [facility] Healthcare Corporationled Provider  Mojgan Reynoso MD as Consulting Physician (Interventional Cardiology)     Reviewed and updated this visit:  Allergies  Meds       Lab Work was done 4/2022 for weight gain. Health Maintenance was reviewed with the Patient and updated. Patient had a Mammogram 12/9/2021 at [facility] [place]. I have requested a copy of that for our chart . Tho Herman, was evaluated through a synchronous (real-time) audio-video encounter. The patient (or guardian if applicable) is aware that this is a billable service, which includes applicable co-pays. This Virtual Visit was conducted with patient's (and/or legal guardian's) consent. The visit was conducted pursuant to the emergency declaration under the [facility], [address] [place] [address] [facility] authority and the [employer] and [employer] General Act. Patient identification was verified, and a caregiver was present when appropriate. The patient was located at Home: [address] [place] [address] Dr Grier [address] [place] [address]. Provider was located at [facility] (Appt Dept): [address]  [place] . Maria Dolores Ross LPN, [id number], performed the documented evaluation under the direct supervision of the attending physician.

## 2022-11-30 NOTE — PATIENT INSTRUCTIONS
Personalized Preventive Plan for [facility] - 11/30/2022  Medicare offers a range of preventive health benefits. Some of the tests and screenings are paid in full while other may be subject to a deductible, co-insurance, and/or copay. Some of these benefits include a comprehensive review of your medical history including lifestyle, illnesses that may run in your family, and various assessments and screenings as appropriate. After reviewing your medical record and screening and assessments performed today your provider may have ordered immunizations, labs, imaging, and/or referrals for you. A list of these orders (if applicable) as well as your Preventive Care list are included within your After Visit Summary for your review. Other Preventive Recommendations:    A preventive eye exam performed by an eye specialist is recommended every 1-2 years to screen for glaucoma; cataracts, macular degeneration, and other eye disorders. A preventive dental visit is recommended every 6 months. Try to get at least 150 minutes of exercise per week or 10,000 steps per day on a pedometer . Order or download the FREE \"Exercise & Physical Activity: Your Everyday Guide\" from [employer]. Call 6-324.915.4240 or search [employer] online. You need 1825-9007 mg of calcium and 1329-8990 IU of vitamin D per day. It is possible to meet your calcium requirement with diet alone, but a vitamin D supplement is usually necessary to meet this goal.  When exposed to the sun, use a sunscreen that protects against both UVA and UVB radiation with an SPF of 30 or greater. Reapply every 2 to 3 hours or after sweating, drying off with a towel, or swimming. Always wear a seat belt when traveling in a car. Always wear a helmet when riding a bicycle or motorcycle. Heart-Healthy Diet   Sodium, Fat, and Cholesterol Controlled Diet       What Is a Heart Healthy Diet?    A heart-healthy diet is one that limits sodium , certain types of fat , and cholesterol . This type of diet is recommended for:   People with any form of cardiovascular disease (eg, coronary heart disease , peripheral vascular disease , previous heart attack , previous stroke )   People with risk factors for cardiovascular disease, such as high blood pressure , high cholesterol , or diabetes   Anyone who wants to lower their risk of developing cardiovascular disease   Sodium    Sodium is a mineral found in many foods. In general, most people consume much more sodium than they need. Diets high in sodium can increase blood pressure and lead to edema (water retention). On a heart-healthy diet, you should consume no more than 2,300 mg (milligrams) of sodium per dayabout the amount in one teaspoon of table salt. The foods highest in sodium include table salt (about 50% sodium), processed foods, convenience foods, and preserved foods. Cholesterol    Cholesterol is a fat-like, waxy substance in your blood. Our bodies make some cholesterol. It is also found in animal products, with the highest amounts in fatty meat, egg yolks, whole milk, cheese, shellfish, and organ meats. On a heart-healthy diet, you should limit your cholesterol intake to less than 200 mg per day. It is normal and important to have some cholesterol in your bloodstream. But too much cholesterol can cause plaque to build up within your arteries, which can eventually lead to a heart attack or stroke. The two types of cholesterol that are most commonly referred to are:   Low-density lipoprotein (LDL) cholesterol  Also known as bad cholesterol, this is the cholesterol that tends to build up along your arteries. Bad cholesterol levels are increased by eating fats that are saturated or hydrogenated. Optimal level of this cholesterol is less than 100. Over 130 starts to get risky for heart disease.    High-density lipoprotein (HDL) cholesterol  Also known as good cholesterol, this type of cholesterol actually carries cholesterol away from your arteries and may, therefore, help lower your risk of having a heart attack. You want this level to be high (ideally greater than 60). It is a risk to have a level less than 40. You can raise this good cholesterol by eating olive oil, canola oil, avocados, or nuts. Exercise raises this level, too. Fat    Fat is calorie dense and packs a lot of calories into a small amount of food. Even though fats should be limited due to their high calorie content, not all fats are bad. In fact, some fats are quite healthful. Fat can be broken down into four main types. The good-for-you fats are:   Monounsaturated fat  found in oils such as olive and canola, avocados, and nuts and natural nut butters; can decrease cholesterol levels, while keeping levels of HDL cholesterol high   Polyunsaturated fat  found in oils such as safflower, sunflower, soybean, corn, and sesame; can decrease total cholesterol and LDL cholesterol   Omega-3 fatty acids  particularly those found in fatty fish (such as salmon, trout, tuna, mackerel, herring, and sardines); can decrease risk of arrhythmias, decrease triglyceride levels, and slightly lower blood pressure   The fats that you want to limit are:   Saturated fat  found in animal products, many fast foods, and a few vegetables; increases total blood cholesterol, including LDL levels   Animal fats that are saturated include: butter, lard, whole-milk dairy products, meat fat, and poultry skin   Vegetable fats that are saturated include: hydrogenated shortening, palm oil, coconut oil, cocoa butter   Hydrogenated or trans fat  found in margarine and vegetable shortening, most shelf stable snack foods, and fried foods; increases LDL and decreases HDL     It is generally recommended that you limit your total fat for the day to less than 30% of your total calories.  If you follow an 1800-calorie heart healthy diet, for example, this would mean 60 grams of fat or less per day. Saturated fat and trans fat in your diet raises your blood cholesterol the most, much more than dietary cholesterol does. For this reason, on a heart-healthy diet, less than 7% of your calories should come from saturated fat and ideally 0% from trans fat. On an 1800-calorie diet, this translates into less than 14 grams of saturated fat per day, leaving 46 grams of fat to come from mono- and polyunsaturated fats.    Food Choices on a Heart Healthy Diet   Food Category   Foods Recommended   Foods to Avoid   Grains   Breads and rolls without salted tops Most dry and cooked cereals Unsalted crackers and breadsticks Low-sodium or homemade breadcrumbs or stuffing All rice and pastas   Breads, rolls, and crackers with salted tops High-fat baked goods (eg, muffins, donuts, pastries) Quick breads, self-rising flour, and biscuit mixes Regular bread crumbs Instant hot cereals Commercially prepared rice, pasta, or stuffing mixes   Vegetables   Most fresh, frozen, and low-sodium canned vegetables Low-sodium and salt-free vegetable juices Canned vegetables if unsalted or rinsed   Regular canned vegetables and juices, including sauerkraut and pickled vegetables Frozen vegetables with sauces Commercially prepared potato and vegetable mixes   Fruits   Most fresh, frozen, and canned fruits All fruit juices   Fruits processed with salt or sodium   Milk   Nonfat or low-fat (1%) milk Nonfat or low-fat yogurt Cottage cheese, low-fat ricotta, cheeses labeled as low-fat and low-sodium   Whole milk Reduced-fat (2%) milk Malted and chocolate milk Full fat yogurt Most cheeses (unless low-fat and low salt) Buttermilk (no more than 1 cup per week)   Meats and Beans   Lean cuts of fresh or frozen beef, veal, lamb, or pork (look for the word loin) Fresh or frozen poultry without the skin Fresh or frozen fish and some shellfish Egg whites and egg substitutes (Limit whole eggs to three per week) Tofu Nuts or seeds (unsalted, dry-roasted), low-sodium peanut butter Dried peas, beans, and lentils   Any smoked, cured, salted, or canned meat, fish, or poultry (including mota, chipped beef, cold cuts, hot dogs, sausages, sardines, and anchovies) Poultry skins Breaded and/or fried fish or meats Canned peas, beans, and lentils Salted nuts   Fats and Oils   Olive oil and canola oil Low-sodium, low-fat salad dressings and mayonnaise   Butter, margarine, coconut and palm oils, mota fat   Snacks, Sweets, and Condiments   Low-sodium or unsalted versions of broths, soups, soy sauce, and condiments Pepper, herbs, and spices; vinegar, lemon, or lime juice Low-fat frozen desserts (yogurt, sherbet, fruit bars) Sugar, cocoa powder, honey, syrup, jam, and preserves Low-fat, trans-fat free cookies, cakes, and pies Sherman and animal crackers, fig bars, yfn snaps   High-fat desserts Broth, soups, gravies, and sauces, made from instant mixes or other high-sodium ingredients Salted snack foods Canned olives Meat tenderizers, seasoning salt, and most flavored vinegars   Beverages   Low-sodium carbonated beverages Tea and coffee in moderation Soy milk   Commercially softened water   Suggestions   Make whole grains, fruits, and vegetables the base of your diet. Choose heart-healthy fats such as canola, olive, and flaxseed oil, and foods high in heart-healthy fats, such as nuts, seeds, soybeans, tofu, and fish. Eat fish at least twice per week; the fish highest in omega-3 fatty acids and lowest in mercury include salmon, herring, mackerel, sardines, and canned chunk light tuna. If you eat fish less than twice per week or have high triglycerides, talk to your doctor about taking fish oil supplements. Read food labels. For products low in fat and cholesterol, look for fat free, low-fat, cholesterol free, saturated fat free, and trans fat freeAlso scan the Nutrition Facts Label, which lists saturated fat, trans fat, and cholesterol amounts. For products low in sodium, look for sodium free, very low sodium, low sodium, no added salt, and unsalted   Skip the salt when cooking or at the table; if food needs more flavor, get creative and try out different herbs and spices. Garlic and onion also add substantial flavor to foods. Trim any visible fat off meat and poultry before cooking, and drain the fat off after bolanos. Use cooking methods that require little or no added fat, such as grilling, boiling, baking, poaching, broiling, roasting, steaming, stir-frying, and sauting. Avoid fast food and convenience food. They tend to be high in saturated and trans fat and have a lot of added salt. Talk to a registered dietitian for individualized diet advice. Last Reviewed: March 2011 Blanca Nelson MS, MPH, RD   Updated: 3/29/2011     Heart-Healthy Diet   Sodium, Fat, and Cholesterol Controlled Diet       What Is a Heart Healthy Diet? A heart-healthy diet is one that limits sodium , certain types of fat , and cholesterol . This type of diet is recommended for:   People with any form of cardiovascular disease (eg, coronary heart disease , peripheral vascular disease , previous heart attack , previous stroke )   People with risk factors for cardiovascular disease, such as high blood pressure , high cholesterol , or diabetes   Anyone who wants to lower their risk of developing cardiovascular disease   Sodium    Sodium is a mineral found in many foods. In general, most people consume much more sodium than they need. Diets high in sodium can increase blood pressure and lead to edema (water retention). On a heart-healthy diet, you should consume no more than 2,300 mg (milligrams) of sodium per dayabout the amount in one teaspoon of table salt. The foods highest in sodium include table salt (about 50% sodium), processed foods, convenience foods, and preserved foods. Cholesterol    Cholesterol is a fat-like, waxy substance in your blood.  Our bodies make some cholesterol. It is also found in animal products, with the highest amounts in fatty meat, egg yolks, whole milk, cheese, shellfish, and organ meats. On a heart-healthy diet, you should limit your cholesterol intake to less than 200 mg per day. It is normal and important to have some cholesterol in your bloodstream. But too much cholesterol can cause plaque to build up within your arteries, which can eventually lead to a heart attack or stroke. The two types of cholesterol that are most commonly referred to are:   Low-density lipoprotein (LDL) cholesterol  Also known as bad cholesterol, this is the cholesterol that tends to build up along your arteries. Bad cholesterol levels are increased by eating fats that are saturated or hydrogenated. Optimal level of this cholesterol is less than 100. Over 130 starts to get risky for heart disease. High-density lipoprotein (HDL) cholesterol  Also known as good cholesterol, this type of cholesterol actually carries cholesterol away from your arteries and may, therefore, help lower your risk of having a heart attack. You want this level to be high (ideally greater than 60). It is a risk to have a level less than 40. You can raise this good cholesterol by eating olive oil, canola oil, avocados, or nuts. Exercise raises this level, too. Fat    Fat is calorie dense and packs a lot of calories into a small amount of food. Even though fats should be limited due to their high calorie content, not all fats are bad. In fact, some fats are quite healthful. Fat can be broken down into four main types.    The good-for-you fats are:   Monounsaturated fat  found in oils such as olive and canola, avocados, and nuts and natural nut butters; can decrease cholesterol levels, while keeping levels of HDL cholesterol high   Polyunsaturated fat  found in oils such as safflower, sunflower, soybean, corn, and sesame; can decrease total cholesterol and LDL cholesterol   Omega-3 fatty acids  particularly those found in fatty fish (such as salmon, trout, tuna, mackerel, herring, and sardines); can decrease risk of arrhythmias, decrease triglyceride levels, and slightly lower blood pressure   The fats that you want to limit are:   Saturated fat  found in animal products, many fast foods, and a few vegetables; increases total blood cholesterol, including LDL levels   Animal fats that are saturated include: butter, lard, whole-milk dairy products, meat fat, and poultry skin   Vegetable fats that are saturated include: hydrogenated shortening, palm oil, coconut oil, cocoa butter   Hydrogenated or trans fat  found in margarine and vegetable shortening, most shelf stable snack foods, and fried foods; increases LDL and decreases HDL     It is generally recommended that you limit your total fat for the day to less than 30% of your total calories. If you follow an 1800-calorie heart healthy diet, for example, this would mean 60 grams of fat or less per day. Saturated fat and trans fat in your diet raises your blood cholesterol the most, much more than dietary cholesterol does. For this reason, on a heart-healthy diet, less than 7% of your calories should come from saturated fat and ideally 0% from trans fat. On an 1800-calorie diet, this translates into less than 14 grams of saturated fat per day, leaving 46 grams of fat to come from mono- and polyunsaturated fats.    Food Choices on a Heart Healthy Diet   Food Category   Foods Recommended   Foods to Avoid   Grains   Breads and rolls without salted tops Most dry and cooked cereals Unsalted crackers and breadsticks Low-sodium or homemade breadcrumbs or stuffing All rice and pastas   Breads, rolls, and crackers with salted tops High-fat baked goods (eg, muffins, donuts, pastries) Quick breads, self-rising flour, and biscuit mixes Regular bread crumbs Instant hot cereals Commercially prepared rice, pasta, or stuffing mixes   Vegetables   Most fresh, frozen, and low-sodium canned vegetables Low-sodium and salt-free vegetable juices Canned vegetables if unsalted or rinsed   Regular canned vegetables and juices, including sauerkraut and pickled vegetables Frozen vegetables with sauces Commercially prepared potato and vegetable mixes   Fruits   Most fresh, frozen, and canned fruits All fruit juices   Fruits processed with salt or sodium   Milk   Nonfat or low-fat (1%) milk Nonfat or low-fat yogurt Cottage cheese, low-fat ricotta, cheeses labeled as low-fat and low-sodium   Whole milk Reduced-fat (2%) milk Malted and chocolate milk Full fat yogurt Most cheeses (unless low-fat and low salt) Buttermilk (no more than 1 cup per week)   Meats and Beans   Lean cuts of fresh or frozen beef, veal, lamb, or pork (look for the word loin) Fresh or frozen poultry without the skin Fresh or frozen fish and some shellfish Egg whites and egg substitutes (Limit whole eggs to three per week) Tofu Nuts or seeds (unsalted, dry-roasted), low-sodium peanut butter Dried peas, beans, and lentils   Any smoked, cured, salted, or canned meat, fish, or poultry (including mota, chipped beef, cold cuts, hot dogs, sausages, sardines, and anchovies) Poultry skins Breaded and/or fried fish or meats Canned peas, beans, and lentils Salted nuts   Fats and Oils   Olive oil and canola oil Low-sodium, low-fat salad dressings and mayonnaise   Butter, margarine, coconut and palm oils, mota fat   Snacks, Sweets, and Condiments   Low-sodium or unsalted versions of broths, soups, soy sauce, and condiments Pepper, herbs, and spices; vinegar, lemon, or lime juice Low-fat frozen desserts (yogurt, sherbet, fruit bars) Sugar, cocoa powder, honey, syrup, jam, and preserves Low-fat, trans-fat free cookies, cakes, and pies Sherman and animal crackers, fig bars, yfn snaps   High-fat desserts Broth, soups, gravies, and sauces, made from instant mixes or other high-sodium ingredients Salted snack foods Canned olives Meat tenderizers, seasoning salt, and most flavored vinegars   Beverages   Low-sodium carbonated beverages Tea and coffee in moderation Soy milk   Commercially softened water   Suggestions   Make whole grains, fruits, and vegetables the base of your diet. Choose heart-healthy fats such as canola, olive, and flaxseed oil, and foods high in heart-healthy fats, such as nuts, seeds, soybeans, tofu, and fish. Eat fish at least twice per week; the fish highest in omega-3 fatty acids and lowest in mercury include salmon, herring, mackerel, sardines, and canned chunk light tuna. If you eat fish less than twice per week or have high triglycerides, talk to your doctor about taking fish oil supplements. Read food labels. For products low in fat and cholesterol, look for fat free, low-fat, cholesterol free, saturated fat free, and trans fat freeAlso scan the Nutrition Facts Label, which lists saturated fat, trans fat, and cholesterol amounts. For products low in sodium, look for sodium free, very low sodium, low sodium, no added salt, and unsalted   Skip the salt when cooking or at the table; if food needs more flavor, get creative and try out different herbs and spices. Garlic and onion also add substantial flavor to foods. Trim any visible fat off meat and poultry before cooking, and drain the fat off after bolanos. Use cooking methods that require little or no added fat, such as grilling, boiling, baking, poaching, broiling, roasting, steaming, stir-frying, and sauting. Avoid fast food and convenience food. They tend to be high in saturated and trans fat and have a lot of added salt. Talk to a registered dietitian for individualized diet advice. Last Reviewed: March 2011 Jc Canchola MS, MPH, RD   Updated: 3/29/2011       Preventing Osteoporosis: After Your Visit  Your Care Instructions  Osteoporosis means the bones are weak and thin enough that they can break easily.  The older you are, the more likely you are to get osteoporosis. But with plenty of calcium, vitamin D, and exercise, you can help prevent osteoporosis. The preteen and teen years are a key time for bone building. With the help of calcium, vitamin D, and exercise in those early years and beyond, the bones reach their peak density and strength by age 27. After age 27, your bones naturally start to thin and weaken. The stronger your bones are at around age 27, the lower your risk for osteoporosis. But no matter what your age and risk are, your bones still need calcium, vitamin D, and exercise to stay strong. Also avoid smoking, and limit alcohol. Smoking and heavy alcohol use can make your bones thinner. Talk to your doctor about any special risks you might have, such as having a close relative with osteoporosis or taking a medicine that can weaken bones. Your doctor can tell you the best ways to protect your bones from thinning. Follow-up care is a key part of your treatment and safety. Be sure to make and go to all appointments, and call your doctor if you are having problems. It's also a good idea to know your test results and keep a list of the medicines you take. How can you care for yourself at home? Get enough calcium and vitamin D. The [place] [employer] recommends adults younger than age 46 need 1,000 mg of calcium and 600 IU of vitamin D each day. Women ages 46 to 79 need 1,200 mg of calcium and 600 IU of vitamin D each day. Men ages 46 to 79 need 1,000 mg of calcium and 600 IU of vitamin D each day. Adults 71 and older need 1,200 mg of calcium and 800 IU of vitamin D each day. Eat foods rich in calcium, like yogurt, cheese, milk, and dark green vegetables. Eat foods rich in vitamin D, like eggs, fatty fish, cereal, and fortified milk. Get some sunshine. Your body uses sunshine to make its own vitamin D. The safest time to be out in the sun is before 10 a.m. or after 3 p.m. Avoid getting sunburned.  Sunburn can increase your risk of skin cancer. Talk to your doctor about taking a calcium plus vitamin D supplement. Ask about what type of calcium is right for you, and how much to take at a time. Adults ages 23 to 48 should not get more than 2,500 mg of calcium and 4,000 IU of vitamin D each day, whether it is from supplements and/or food. Adults ages 46 and older should not get more than 2,000 mg of calcium and 4,000 IU of vitamin D each day from supplements and/or food. Get regular bone-building exercise. Weight-bearing and resistance exercises keep bones healthy by working the muscles and bones against gravity. Start out at an exercise level that feels right for you. Add a little at a time until you can do the following:  Do 30 minutes of weight-bearing exercise on most days of the week. Walking, jogging, stair climbing, and dancing are good choices. Do resistance exercises with weights or elastic bands 2 to 3 days a week. Limit alcohol. Drink no more than 1 alcohol drink a day if you are a woman. Drink no more than 2 alcohol drinks a day if you are a man. Do not smoke. Smoking can make bones thin faster. If you need help quitting, talk to your doctor about stop-smoking programs and medicines. These can increase your chances of quitting for good. When should you call for help? Watch closely for changes in your health, and be sure to contact your doctor if:  You need help with a healthy eating plan. You need help with an exercise plan    © 7519-8866 [employer]. Care instructions adapted under license by [facility]. This care instruction is for use with your licensed healthcare professional. If you have questions about a medical condition or this instruction, always ask your healthcare professional. [place] [id number] any warranty or liability for your use of this information. Content Version: 9.4.52809;  Last Revised: June 20, 2011                Keep Your Memory Stanley Mcdaniels factors can affect your ability to remembera hectic lifestyle, aging, stress, chronic disease, and certain medicines. But, there are steps you can take to sharpen your mind and help preserve your memory. Challenge Your Brain   Regularly challenging your mind may help keeps it in top shape. Good mental exercises include:   Crossword puzzlesUse a dictionary if you need it; you will learn more that way. Brainteasers Try some! Crafts, such as wood working and sewing   Hobbies, such as gardening and building model airplanes   SocializingVisit old friends or join groups to meet new ones. Reading   Learning a new language   Taking a class, whether it be art history or valerio chi   TravelingExperience the food, history, and culture of your destination   Learning to use a computer   Going to museums, the theater, or thought-provoking movies   Changing things in your daily life, such as reversing your pattern in the grocery store or brushing your teeth using your nondominant hand   Use Memory Aids   There is no need to remember every detail on your own. These memory aids can help:   Calendars and day planners   Electronic organizers to store all sorts of helpful informationThese devices can \"beep\" to remind you of appointments. A book of days to record birthdays, anniversaries, and other occasions that occur on the same date every year   Detailed \"to-do\" lists and strategically placed sticky notes   Quick \"study\" sessionsBefore a gathering, review who will be there so their names will be fresh in your mind. Establish routinesFor example, keep your keys, wallet, and umbrella in the same place all the time or take medicine with your 8:00 AM glass of juice   Live a Healthy Life   Many actions that will keep your body strong will do the same for your mind. For example:   Talk to Your Doctor About Herbs and Supplements    Malnutrition and vitamin deficiencies can impair your mental function.  For example, vitamin B12 deficiency can cause a range of symptoms, including confusion. But, what if your nutritional needs are being met? Can herbs and supplements still offer a benefit? Researchers have investigated a range of natural remedies, such as ginkgo , ginseng , and the supplement phosphatidylserine (PS). So far, though, the evidence is inconsistent as to whether these products can improve memory or thinking. If you are interested in taking herbs and supplements, talk to your doctor first because they may interact with other medicines that you are taking. Exercise Regularly    Among the many benefits of regular exercise are increased blood flow to the brain and decreased risk of certain diseases that can interfere with memory function. One study found that even moderate exercise has a beneficial effect. Examples of \"moderate\" exercise include:   Playing 18 holes of golf once a week, without a cart   Playing tennis twice a week   Walking one mile per day   Manage Stress    It can be tough to remember what is important when your mind is cluttered. Make time for relaxation. Choose activities that calm you down, and make it routine. Manage Chronic Conditions    Side effects of high blood pressure , diabetes, and heart disease can interfere with mental function. Many of the lifestyle steps discussed here can help manage these conditions. Strive to eat a healthy diet, exercise regularly, get stress under control, and follow your doctor's advice for your condition. Minimize Medications    Talk to your doctor about the medicines that you take. Some may be unnecessary. Also, healthy lifestyle habits may lower the need for certain drugs. Last Reviewed: April 2010 Yeni Glass MD   Updated: 4/13/2010     Keeping Home a [facility]       As we get older, changes in balance, gait, strength, vision, hearing, and cognition make even the most youthful senior more prone to accidents.  Falls are one of the leading health risks for older people. This increased risk of falling is related to:   Aging process (eg, decreased muscle strength, slowed reflexes)   Higher incidence of chronic health problems (eg, arthritis, diabetes) that may limit mobility, agility or sensory awareness   Side effects of medicine (eg, dizziness, blurred vision)especially medicines like prescription pain medicines and drugs used to treat mental health conditions   Depending on the brittleness of your bones, the consequences of a fall can be serious and long lasting. Home Life   Research by the Association of Aging [employer] [facility]) shows that some home accidents among older adults can be prevented by making simple lifestyle changes and basic modifications and repairs to the home environment. Here are some lifestyle changes that experts recommend:   Have your hearing and vision checked regularly. Be sure to wear prescription glasses that are right for you. Speak to your doctor or pharmacist about the possible side effects of your medicines. A number of medicines can cause dizziness. If you have problems with sleep, talk to your doctor. Limit your intake of alcohol. If necessary, use a cane or walker to help maintain your balance. Wear supportive, rubber-soled shoes, even at home. If you live in a region that gets wintry weather, you may want to put special cleats on your shoes to prevent you from slipping on the snow and ice. Exercise regularly to help maintain muscle tone, agility, and balance. Always hold the banister when going up or down stairs. Also, use  bars when getting in or out of the bath or shower, or using the toilet. To avoid dizziness, get up slowly from a lying down position. Sit up first, dangling your legs for a minute or two before rising to a standing position. Overall Home Safety Check   According to the Consumer Product Safety Commision's \"Older Consumer Home Safety Checklist,\" it is important to check for potential hazards in each room. And remember, proper lighting is an essential factor in home safety. If you cannot see clearly, you are more likely to fall. Important questions to ask yourself include:   Are lamp, electric, extension, and telephone cords placed out of the flow of traffic and maintained in good condition? Have frayed cords been replaced? Are all small rugs and runners slip resistant? If not, you can secure them to the floor with a special double-sided carpet tape. Are smoke detectors properly locatedone on every floor of your home and one outside of every sleeping area? Are they in good working order? Are batteries replaced at least once a year? Do you have a well-maintained carbon monoxide detector outside every sleeping are in your home? Does your furniture layout leave plenty of space to maneuver between and around chairs, tables, beds, and sofas? Are hallways, stairs and passages between rooms well lit? Can you reach a lamp without getting out of bed? Are floor surfaces well maintained? Shag rugs, high-pile carpeting, tile floors, and polished wood floors can be particularly slippery. Stairs should always have handrails and be carpeted or fitted with a non-skid tread. Is your telephone easily reachable. Is the cord safely tucked away? Room by Room   According to the Association of Aging, bathrooms and tory are the two most potentially hazardous rooms in your home. In the Kitchen    Be sure your stove is in proper working order and always make sure burners and the oven are off before you go out or go to sleep. Keep pots on the back burners, turn handles away from the front of the stove, and keep stove clean and free of grease build-up. Kitchen ventilation systems and range exhausts should be working properly. Keep flammable objects such as towels and pot holders away from the cooking area except when in use. Make sure kitchen curtains are tied back.     Move cords and appliances away from the sink and hot surfaces. If extension cords are needed, install wiring guides so they do not hang over the sink, range, or working areas. Look for coffee pots, kettles and toaster ovens with automatic shut-offs. Keep a mop handy in the kitchen so you can wipe up spills instantly. You should also have a small fire extinguisher. Arrange your kitchen with frequently used items on lower shelves to avoid the need to stand on a stepstool to reach them. Make sure countertops are well-lit to avoid injuries while cutting and preparing food. In the Bathroom    Use a non-slip mat or decals in the tub and shower, since wet, soapy tile or porcelain surfaces are extremely slippery. Make sure bathroom rugs are non-skid or tape them firmly to the floor. Bathtubs should have at least one, preferably two, grab bars, firmly attached to structural supports in the wall. (Do not use built-in soap holders or glass shower doors as grab bars.)    Tub seats fitted with non-slip material on the legs allow you to wash sitting down. For people with limited mobility, bathtub transfer benches allow you to slide safely into the tub. Raised toilet seats and toilet safety rails are helpful for those with knee or hip problems. In the [facility]    Make sure you use a nightlight and that the area around your bed is clear of potential obstacles. Be careful with electric blankets and never go to sleep with a heating pad, which can cause serious burns even if on a low setting. Use fire-resistant mattress covers and pillows, and NEVER smoke in bed. Keep a phone next to the bed that is programmed to dial 911 at the push of a button. If you have a chronic condition, you may want to sign on with an automatic call-in service. Typically the system includes a small pendant that connects directly to an emergency medical voice-response system.  You should also make arrangements to stay in contact with someonefriend, neighbor, family memberon a regular schedule. Fire Prevention   According to the [employer] (Smoke Alarms for Every) [address] [facility], senior citizens are one of the two highest risk groups for death and serious injuries due to residential fires. When cooking, wear short-sleeved items, never a bulky long-sleeved robe. The [facility]'s Safety Checklist for Older Consumers emphasizes the importance of checking basements, garages, workshops and storage areas for fire hazards, such as volatile liquids, piles of old rags or clothing and overloaded circuits. Never smoke in bed or when lying down on a couch or recliner chair. Small portable electric or kerosene heaters are responsible for many home fires and should be used cautiously if at all. If you do use one, be sure to keep them away from flammable materials. In case of fire, make sure you have a pre-established emergency exit plan. Have a professional check your fireplace and other fuel-burning appliances yearly. Helping [employer]   Baby boomers entering the floyd years will continue to see the development of new products to help older adults live safely and independently in spite of age-related changes. Making Life More Livable  , by Juana Freeman, lists over 1,000 products for \"living well in the mature years,\" such as bathing and mobility aids, household security devices, ergonomically designed knives and peelers, and faucet valves and knobs for temperature control. Medical supply stores and organizations are good sources of information about products that improve your quality of life and insure your safety.      Last Reviewed: November 2009 Kaiser Gamino MD   Updated: 3/7/2011

## 2023-01-04 RX ORDER — LEVOTHYROXINE SODIUM 0.1 MG/1
100 TABLET ORAL DAILY
Qty: 90 TABLET | Refills: 3 | Status: SHIPPED | OUTPATIENT
Start: 2023-01-04 | End: 2023-04-04

## 2023-02-01 RX ORDER — DULOXETIN HYDROCHLORIDE 60 MG/1
60 CAPSULE, DELAYED RELEASE ORAL DAILY
Qty: 90 CAPSULE | Refills: 2 | Status: SHIPPED | OUTPATIENT
Start: 2023-02-01 | End: 2023-05-02

## 2023-08-01 RX ORDER — TRAZODONE HYDROCHLORIDE 100 MG/1
TABLET ORAL
Qty: 90 TABLET | Refills: 3 | Status: SHIPPED | OUTPATIENT
Start: 2023-08-01

## 2023-11-20 RX ORDER — ALLOPURINOL 100 MG/1
100 TABLET ORAL DAILY
Qty: 90 TABLET | Refills: 3 | Status: SHIPPED | OUTPATIENT
Start: 2023-11-20

## 2024-05-31 RX ORDER — TRAZODONE HYDROCHLORIDE 100 MG/1
TABLET ORAL
Qty: 90 TABLET | Refills: 3 | OUTPATIENT
Start: 2024-05-31

## 2024-07-22 RX ORDER — LEVOTHYROXINE SODIUM 100 UG/1
100 TABLET ORAL DAILY
Qty: 90 TABLET | Refills: 3 | OUTPATIENT
Start: 2024-07-22 | End: 2024-10-20

## 2024-11-25 NOTE — TELEPHONE ENCOUNTER
Requested Prescriptions     Pending Prescriptions Disp Refills    oxybutynin (DITROPAN) 5 MG tablet 180 tablet 0
Her/She

## 2024-12-13 RX ORDER — ALLOPURINOL 100 MG/1
100 TABLET ORAL DAILY
Qty: 90 TABLET | Refills: 3 | Status: SHIPPED | OUTPATIENT
Start: 2024-12-13

## 2025-03-12 ENCOUNTER — TELEPHONE (OUTPATIENT)
Dept: HEMATOLOGY | Age: 80
End: 2025-03-12

## 2025-03-12 NOTE — TELEPHONE ENCOUNTER
Called Patient and reminded patient of their appointment on 03/17/2025 and patient confirmed they would be here. Reminded patient to just come at appointment time, and to not come at the lab appointment time. Reminded patient that we will not check them in any more than 30 minutes before appointment time.  We have now moved to the [facility] [place] that is located between our old office and the ER at the [facility]. Letting the Pt know that our front entrance faces the  [place]. Reminded pt to eat well and be well hydrated for their labs.